# Patient Record
Sex: FEMALE | Employment: FULL TIME | URBAN - METROPOLITAN AREA
[De-identification: names, ages, dates, MRNs, and addresses within clinical notes are randomized per-mention and may not be internally consistent; named-entity substitution may affect disease eponyms.]

---

## 2019-07-23 ENCOUNTER — HOSPITAL ENCOUNTER (OUTPATIENT)
Dept: ULTRASOUND IMAGING | Age: 29
Discharge: HOME | End: 2019-07-23
Attending: PHYSICIAN ASSISTANT
Payer: COMMERCIAL

## 2019-07-23 DIAGNOSIS — R10.9 STOMACH ACHE: ICD-10-CM

## 2019-07-23 PROCEDURE — 76705 ECHO EXAM OF ABDOMEN: CPT

## 2021-10-05 LAB
ANTIBODY SCREEN, EXTERNAL: NEGATIVE
ANTIBODY SCREEN, EXTERNAL: NEGATIVE
CHLAMYDIA, EXTERNAL: NEGATIVE
HBSAG, EXTERNAL: NEGATIVE
HEPATITIS C AB,   EXT: NEGATIVE
HGB, EXTERNAL: 12.5
HIV, EXTERNAL: NEGATIVE
N. GONORRHEA, EXTERNAL: NEGATIVE
RPR, EXTERNAL: NEGATIVE
RUBELLA, EXTERNAL: NORMAL
TYPE, ABO & RH, EXTERNAL: NORMAL
URINALYSIS, EXTERNAL: NEGATIVE

## 2021-10-21 LAB
HGB EVAL, EXTERNAL: NORMAL
PLATELET CNT,   EXTERNAL: 263

## 2022-01-18 RX ORDER — ASPIRIN 81 MG/1
TABLET ORAL
COMMUNITY

## 2022-01-18 RX ORDER — DIPHENHYDRAMINE HCL 25 MG
CAPSULE ORAL
COMMUNITY

## 2022-01-18 RX ORDER — METOCLOPRAMIDE 10 MG/1
TABLET ORAL
COMMUNITY

## 2022-01-18 RX ORDER — LORATADINE 10 MG/1
TABLET ORAL
COMMUNITY
End: 2022-02-18

## 2022-01-18 NOTE — PATIENT INSTRUCTIONS
Weeks 18 to 22 of Your Pregnancy: Care Instructions  Overview     Your baby is continuing to develop quickly. Sometime between 18 and 22 weeks, you'll probably start to feel your baby move. At first, these small fetal movements feel like fluttering or \"butterflies. \" Or they may feel like gas bubbles. As your baby grows, these movements will become stronger. You may also notice that your baby hiccups. Babies at this stage can now suck their thumbs. You may find that your nausea and fatigue are gone. You may feel better overall and have more energy than you did in your first trimester. But you might now also have some new discomforts, like sleep problems or leg cramps. Talk to your doctor about things you can do at home to ease these problems. Follow-up care is a key part of your treatment and safety. Be sure to make and go to all appointments, and call your doctor if you are having problems. It's also a good idea to know your test results and keep a list of the medicines you take. How can you care for yourself at home? Ease sleep problems  · Avoid caffeine in drinks or chocolate late in the day. · Get some exercise every day. · Take a warm shower or bath before bed. · Have a light snack or glass of milk at bedtime. · Do relaxation exercises in bed to calm your mind and body. · Support your legs and back with extra pillows. Try a pillow between your legs if you sleep on your side. · Do not use sleeping pills or alcohol. They could harm your baby. Ease leg cramps  · Do not massage your calf during the cramp. · Sit on a firm bed or chair. Straighten your leg, and bend your foot (flex your ankle) slowly upward, toward your knee. Bend your toes up and down. · Stand on a cool, flat surface. Stretch your toes upward, and take small steps walking on your heels. · Use a heating pad or hot water bottle to help with muscle ache. Prevent leg cramps  · Be sure to get enough calcium.  If you are worried that you are not getting enough, talk to your doctor. · Exercise every day, and stretch your legs before bed. · Take a warm bath before bed, and try leg warmers at night. Where can you learn more? Go to http://www.gray.com/  Enter L819 in the search box to learn more about \"Weeks 18 to 22 of Your Pregnancy: Care Instructions. \"  Current as of: June 16, 2021               Content Version: 13.0  © 2006-2021 Healthwise, [facility]. Care instructions adapted under license by [employer] (which disclaims liability or warranty for this information). If you have questions about a medical condition or this instruction, always ask your healthcare professional. [address] any warranty or liability for your use of this information.

## 2022-01-19 ENCOUNTER — INITIAL PRENATAL (OUTPATIENT)
Dept: OBGYN CLINIC | Age: 32
End: 2022-01-19

## 2022-01-19 VITALS — WEIGHT: 199.4 LBS | HEART RATE: 101 BPM | SYSTOLIC BLOOD PRESSURE: 121 MMHG | DIASTOLIC BLOOD PRESSURE: 81 MMHG

## 2022-01-19 DIAGNOSIS — Z34.80 PRENATAL CARE OF MULTIGRAVIDA, ANTEPARTUM: Primary | ICD-10-CM

## 2022-01-19 PROCEDURE — 0502F SUBSEQUENT PRENATAL CARE: CPT | Performed by: OBSTETRICS & GYNECOLOGY

## 2022-01-19 RX ORDER — ASCORBIC ACID, CHOLECALCIFEROL, .ALPHA.-TOCOPHEROL ACETATE, DL-, PYRIDOXINE HYDROCHLORIDE, FOLIC ACID, CYANOCOBALAMIN, BIOTIN, CALCIUM CARBONATE, FERROUS ASPARTO GLYCINATE, IRON, POTASSIUM IODIDE, MAGNESIUM OXIDE, DOCONEXENT AND LOWBUSH BLUEBERRY 60; 1000; 10; 26; 400; 13; 280; 80; 9; 9; 150; 25; 350; 25; 600 MG/1; [IU]/1; [IU]/1; MG/1; UG/1; UG/1; UG/1; MG/1; MG/1; MG/1; UG/1; MG/1; MG/1; MG/1; UG/1
CAPSULE, GELATIN COATED ORAL
COMMUNITY
Start: 2021-11-18

## 2022-01-19 RX ORDER — FAMOTIDINE 20 MG/1
20 TABLET, FILM COATED ORAL 2 TIMES DAILY
COMMUNITY

## 2022-01-19 NOTE — PROGRESS NOTES
[facility]  [email or web address]  [phone]    Maude Velásquez MD, FACOG       [facility]   FOLLOW UP OB NOTE WITH ULTRASOUND    Chief Complaint   Patient presents with    Routine Prenatal Visit       The patient reports the following concerns: none    I discussed with the patient the limitations of ultrasound and that imaging can not rule out all birth defects, chromosomal problems, or other problems with the baby. Bleeding precautions. Rec MFM fu: has appt scheduled in [address] [place] [address] risk of pregnancy complications with previa and myoma  rec serial growth US w mfm  Bleeding precautions    Disc we need outside OB records and MFM records. Fu 2 wks review records,  MFM referral: pt has already        US findings: FETAL SURVEY  A SINGLE VIABLE IUP AT 23W4D GA BY LMP IS SEEN. FETAL CARDIAC MOTION OBSERVED. FETAL ANATOMY WELL VISUALIZED AND APPEARS WITHIN NORMAL LIMITS. NO ABNORMALITIES ARE SEEN ON TODAY'S EXAM.  FACE, NOSE/LIPS, PROFILE, CSP, LAT VENT, CER/CM, CP, SPINE, KIDNEYS, STOMACH/DIAPHRAGM, BLADDER, 3VC,  CORD INSERTION, 4CH, 3VV, ARMS, LEGS, HANDS, FEET. ANATOMY NOT SEEN: RVOT, LVOT, DA, AO.  APPROPRIATE FETAL GROWTH IS SEEN. SIZE=DATES. ASYA APPEAR WITHIN NORMAL LIMITS. ABNORMALITIES SEEN IN THE POSTERIOR HERIBERTO AT THE LEVEL OF THE CERVIX. POSSIBLE FIBROIDS VS PREVIA.  RECOMMENDED. GENDER: XX, PATIENT DOES NOT KNOW. Physician review of ultrasound performed by technician    Today's ultrasound report and images were reviewed and discussed with the patient.   Please see images and imaging report entered by technician in PACS for more detail and progress note and diagnosis entered by MD. Sharona Velasco MD      On this date, 2022,  I have spent 20  minutes reviewing previous notes, test results and face to face with the patient discussing the diagnosis and importance of compliance with the treatment plan as well as documenting on the day of the visit.

## 2022-01-20 LAB
ANTIBODY SCREEN, EXTERNAL: NEGATIVE
TYPE, ABO & RH, EXTERNAL: NEGATIVE

## 2022-01-21 LAB — TYPE, ABO & RH, EXTERNAL: NORMAL

## 2022-02-03 ENCOUNTER — ROUTINE PRENATAL (OUTPATIENT)
Dept: OBGYN CLINIC | Age: 32
End: 2022-02-03
Payer: COMMERCIAL

## 2022-02-03 VITALS
HEIGHT: 66 IN | WEIGHT: 202.6 LBS | SYSTOLIC BLOOD PRESSURE: 133 MMHG | BODY MASS INDEX: 32.56 KG/M2 | HEART RATE: 108 BPM | DIASTOLIC BLOOD PRESSURE: 80 MMHG

## 2022-02-03 DIAGNOSIS — Z34.80 PRENATAL CARE OF MULTIGRAVIDA, ANTEPARTUM: Primary | ICD-10-CM

## 2022-02-03 PROCEDURE — 0502F SUBSEQUENT PRENATAL CARE: CPT | Performed by: OBSTETRICS & GYNECOLOGY

## 2022-02-03 NOTE — PROGRESS NOTES
[facility]  [email or web address]  [phone]    Rob Solano MD, FACOG     Follow-up OB visit    Chief Complaint   Patient presents with    Routine Prenatal Visit       There are no problems to display for this patient. The patient reports the following concerns: c/o sinus pressure d/t severe allergies; before pregnancy she used to use more medications for it; she is usuing benadryl & claritin which is slightly helping. Pt has a nose bleed q morning when she wakes up for the last week. Glucola NV, instructions given. Has MFM fu in [place] 1/21    Vitals:    22 0923   BP: 133/80   Pulse: (!) 108   Weight: 202 lb 9.6 oz (91.9 kg)   Height: 5' 6\" (1.676 m)     See PN flowsheet for exam    33 y.o.  25w5d   Encounter Diagnosis   Name Primary?  Prenatal care of multigravida, antepartum Yes       glucola NV  Disc need for MFM records: business card/fax number given to pt so they can send report  Disc safer meds to use during pregnancy  Notify MD if NI     [] SAB/bleeding precautions reviewed   [x] PTL/PPROM precautions reviewed   [] Labor precautions reviewed   [] Fetal kick counts discussed   [] Labs reviewed with patient   [] Eulene Belling precautions reviewed   [] Consent reviewed   [] Handouts given to pt   [] Glucola handout    [] GBS/labor/Magic Hour handout   []    [] We reviewed CDC recommendations for Tdap for patient and close contacts and RBA of receiving in pregnancy, advised obtaining in third trimester   [] Reviewed healthy nutrition in pregnancy and good exercise practices   [] We disc safer medications in pregnancy and referred patient to [facility] resources   [] We reviewed CDC recommendations for flu vaccine and RBA of receiving in pregnancy   []    []    []       Follow-up and Dispositions    · Return in about 2 weeks (around 2022). No orders of the defined types were placed in this encounter.       Rob Solano MD

## 2022-02-03 NOTE — PATIENT INSTRUCTIONS
Learning About Screening for Gestational Diabetes  What is gestational diabetes screening? Screening for gestational diabetes is a way to look for high blood sugar during pregnancy. You drink some very sweet liquid. Then you have a blood test to see how your body uses sugar (glucose). How is gestational diabetes screening done? Screening for gestational diabetes may be done in a couple of ways. Two-part screening. · Part one (glucose challenge test): A blood sample is taken after you drink a liquid that contains sugar (glucose). You don't need to stop eating or drinking before this test. If the test shows that you don't have a lot of sugar in your blood, you don't have gestational diabetes. · Part two (oral glucose tolerance test, or OGTT): If the first test shows a lot of sugar in your blood, then you may have an OGTT. You can't eat or drink for at least 8 hours before this test. A blood sample is taken, then you drink a sweet liquid. You have more blood tests after 1 to 3 hours. If the OGTT shows that you have a lot of sugar in your blood, you may have gestational diabetes. One-part screening. Sometimes doctors use the OGTT on its own. If the test shows that you don't have a lot of sugar in your blood, you don't have gestational diabetes. If you do have a lot of sugar in your blood, you may have the condition. What are the risks of screening? Your blood glucose level may drop very low toward the end of the test. If this happens, you may feel weak, hungry, and restless. Tell your doctor if you have these symptoms. The test usually will be stopped. You may vomit after drinking the sweet liquid. If this happens, you may need to take the test at a later time. Your doctor may do more glucose tests at other times during your pregnancy. Follow-up care is a key part of your treatment and safety. Be sure to make and go to all appointments, and call your doctor if you are having problems.  It's also a good idea to know your test results and keep a list of the medicines you take. Where can you learn more? Go to http://www.gray.com/  Enter A472 in the search box to learn more about \"Learning About Screening for Gestational Diabetes. \"  Current as of: August 31, 2020               Content Version: 13.0  © 2006-2021 [employer]. Care instructions adapted under license by [employer] (which disclaims liability or warranty for this information). If you have questions about a medical condition or this instruction, always ask your healthcare professional. [place] [address] any warranty or liability for your use of this information. Weeks 22 to 26 of Your Pregnancy: Care Instructions  Overview     As you enter your 7th month of pregnancy at week 26, your baby's lungs are growing stronger and getting ready to breathe. You may notice that your baby responds to the sound of your voice. You may also notice that your baby does less turning and twisting and more squirming, kicking, or jerking. Jerking often means that your baby has hiccups. Hiccups are normal and are only temporary. You may want to think about attending a childbirth preparation class. This is also a good time to start thinking about whether you want to have pain medicine during labor. You may be tested for gestational diabetes between weeks 25 and 28. Gestational diabetes occurs when your blood sugar level gets too high when you're pregnant. The test is important, because you can have gestational diabetes and not know it. But the condition can cause problems for your baby. Follow-up care is a key part of your treatment and safety. Be sure to make and go to all appointments, and call your doctor if you are having problems. It's also a good idea to know your test results and keep a list of the medicines you take. How can you care for yourself at home?   Ease discomfort from your baby's kicking  · Change your position. Sometimes this will cause your baby to change position too. · Take a deep breath while you raise your arm over your head. Then breathe out while you drop your arm. Do Kegel exercises to prevent urine from leaking  · You can do Kegel exercises while you stand or sit. ? Squeeze the same muscles you would use to stop your urine. Your belly and thighs should not move. ? Hold the squeeze for 3 seconds, and then relax for 3 seconds. ? Start with 3 seconds. Then add 1 second each week until you are able to squeeze for 10 seconds. ? Repeat the exercise 10 to 15 times for each session. Do three or more sessions each day. Ease or reduce swelling in your feet, ankles, hands, and fingers  · If your fingers are puffy, take off your rings. · Do not eat high-salt foods, such as potato chips. · Prop up your feet on a stool or couch as much as possible. Sleep with pillows under your feet. · Do not stand for long periods of time or wear tight shoes. · Wear support stockings. Where can you learn more? Go to http://www.Taecanet.com/  Enter G264 in the search box to learn more about \"Weeks 22 to 26 of Your Pregnancy: Care Instructions. \"  Current as of: June 16, 2021               Content Version: 13.0  © 6223-9099 Healthwise, [employer]. Care instructions adapted under license by [employer] (which disclaims liability or warranty for this information). If you have questions about a medical condition or this instruction, always ask your healthcare professional. [place] [address] any warranty or liability for your use of this information.

## 2022-02-18 ENCOUNTER — ROUTINE PRENATAL (OUTPATIENT)
Dept: OBGYN CLINIC | Age: 32
End: 2022-02-18
Payer: COMMERCIAL

## 2022-02-18 VITALS
WEIGHT: 203.4 LBS | HEART RATE: 108 BPM | SYSTOLIC BLOOD PRESSURE: 114 MMHG | HEIGHT: 66 IN | BODY MASS INDEX: 32.69 KG/M2 | DIASTOLIC BLOOD PRESSURE: 75 MMHG

## 2022-02-18 DIAGNOSIS — Z23 ENCOUNTER FOR IMMUNIZATION: ICD-10-CM

## 2022-02-18 DIAGNOSIS — Z34.80 PRENATAL CARE OF MULTIGRAVIDA, ANTEPARTUM: Primary | ICD-10-CM

## 2022-02-18 DIAGNOSIS — Z67.91 BLOOD TYPE, RH NEGATIVE: ICD-10-CM

## 2022-02-18 LAB — GTT 60 MIN, EXTERNAL: 163

## 2022-02-18 PROCEDURE — 96372 THER/PROPH/DIAG INJ SC/IM: CPT | Performed by: OBSTETRICS & GYNECOLOGY

## 2022-02-18 PROCEDURE — 90715 TDAP VACCINE 7 YRS/> IM: CPT | Performed by: OBSTETRICS & GYNECOLOGY

## 2022-02-18 PROCEDURE — 90384 RH IG FULL-DOSE IM: CPT | Performed by: OBSTETRICS & GYNECOLOGY

## 2022-02-18 PROCEDURE — 90471 IMMUNIZATION ADMIN: CPT | Performed by: OBSTETRICS & GYNECOLOGY

## 2022-02-18 PROCEDURE — 0502F SUBSEQUENT PRENATAL CARE: CPT | Performed by: OBSTETRICS & GYNECOLOGY

## 2022-02-18 NOTE — PROGRESS NOTES
_ [facility]  [email or web address]  [phone]    Karolyn Murphy MD, FACOG     Follow-up OB visit    Chief Complaint   Patient presents with    Routine Prenatal Visit       There are no problems to display for this patient. The patient reports the following concerns: doing well, c/o fatigue. Gtt, cbc, tdap, 28wk handout, antibody screen, rhogam today. MFM on 22. Vitals:    22 0931   BP: 114/75   Pulse: (!) 108   Weight: 203 lb 6.4 oz (92.3 kg)   Height: 5' 6\" (1.676 m)     See PN flowsheet for exam    33 y.o.  27w6d   Encounter Diagnoses   Name Primary?  Encounter for immunization     Blood type, Rh negative     Prenatal care of multigravida, antepartum Yes     Awaiting MFM fu re ?  Previa and plan for delivery  Bleeding precautions  Pt will have C/ Jesus Cho [address] sent to me (has my card)  Disc inc risks for bleeding/CS depending on placenta   [] SAB/bleeding precautions reviewed   [] PTL/PPROM precautions reviewed   [] Labor precautions reviewed   [] Fetal kick counts discussed   [] Labs reviewed with patient   [] Karen Hassell precautions reviewed   [x] Consent reviewed   [] Handouts given to pt   [] Glucola handout    [] GBS/labor/Magic Hour handout   []    [] We reviewed CDC recommendations for Tdap for patient and close contacts and RBA of receiving in pregnancy, advised obtaining in third trimester   [] Reviewed healthy nutrition in pregnancy and good exercise practices   [] We disc safer medications in pregnancy and referred patient to [facility] resources   [] We reviewed CDC recommendations for flu vaccine and RBA of receiving in pregnancy   []    []    []           Orders Placed This Encounter    [place] [address] [place]    Tetanus, diphtheria toxoids and acellular pertussis (TDAP) vaccine, in individuals >=7 years, IM    GLUCOSE, GESTATIONAL 1 HR TOLERANCE    CBC W/O DIFF    ANTIBODY SCREEN    FL IMMUNIZ ADMIN,1 SINGLE/COMB VAC/TOXOID    rho D immune globulin (RHOGAM) 1,500 unit (300 mcg) injection 0.3 mg       Asael Stephens MD

## 2022-02-18 NOTE — PROGRESS NOTES
Myra Pace is a 33 y.o. female who presents for tdap immunization per verbal order from Nilton Olmos MD.  She denies any symptoms , reactions or allergies that would exclude them from being immunized today. Risks and adverse reactions were discussed and the VIS was given to her. All questions were addressed. She was observed for 10 min post injection. There were no reactions observed. Myra Pace is a 33 y.o. female who presents for rhogam immunization per verbal order from Nilton Olmos MD.  She denies any symptoms , reactions or allergies that would exclude them from being immunized today. Risks and adverse reactions were discussed and the VIS was given to her. All questions were addressed. She was observed for 10 min post injection. There were no reactions observed.

## 2022-02-18 NOTE — PATIENT INSTRUCTIONS
Weeks 26 to 30 of Your Pregnancy: Care Instructions  Overview     You are now entering your last trimester of pregnancy. Your baby is growing quickly. Ulises Huynh probably feel your baby moving around more often. Your doctor may ask you to count your baby's kicks. Your back may ache as your body gets used to your baby's size and length. If you haven't already had the Tdap shot during this pregnancy, talk to your doctor about getting it. It will help protect your  against pertussis infection. During this time, it's important to take care of yourself and pay attention to what your body needs. If you feel sexual, you can explore ways to be close with your partner that match your comfort and desire. Follow-up care is a key part of your treatment and safety. Be sure to make and go to all appointments, and call your doctor if you are having problems. It's also a good idea to know your test results and keep a list of the medicines you take. How can you care for yourself at home? Take it easy at work  · Take frequent breaks. If possible, stop working when you are tired, and rest during your lunch hour. · Take bathroom breaks every 2 hours. · Change positions often. If you sit for long periods, stand up and walk around. · When you stand for a long time, keep one foot on a low stool with your knee bent. After standing a lot, sit with your feet up. · Avoid fumes, chemicals, and tobacco smoke. Be sexual in your own way  · Having sex during pregnancy is okay, unless your doctor tells you not to. · You may be very interested in sex, or you may have no interest at all. · Your growing belly can make it hard to find a good position during intercourse. [place] and explore. · You may get cramps in your uterus when your partner touches your breasts. · A back rub may relieve the backache or cramps that sometimes follow orgasm. Learn about  labor  · Watch for signs of  labor.  You may be going into labor if:  ? You have menstrual-like cramps, with or without nausea. ? You have about 6 or more contractions in 1 hour, even after you have had a glass of water and are resting. ? You have a low, dull backache that does not go away when you change your position. ? You have pain or pressure in your pelvis that comes and goes in a pattern. ? You have intestinal cramping or flu-like symptoms, with or without diarrhea.  ? You notice an increase or change in your vaginal discharge. Discharge may be heavy, mucus-like, watery, or streaked with blood. ? Your water breaks. · If you think you have  labor:  ? Drink 2 or 3 glasses of water or juice. Not drinking enough fluids can cause contractions. ? Stop what you are doing, and empty your bladder. Then lie down on your left side for at least 1 hour. ? While lying on your side, find your breast bone. Put your fingers in the soft spot just below it. Move your fingers down toward your belly button to find the top of your uterus. Check to see if it is tight. ? Contractions can be weak or strong. Record your contractions for an hour. Time a contraction from the start of one contraction to the start of the next one.  ? Single or several strong contractions without a pattern are called [place] contractions. They are practice contractions but not the start of labor. They often stop if you change what you are doing. ? Call your doctor if you have regular contractions. Where can you learn more? Go to [email or web address]  Enter E130 in the search box to learn more about \"Weeks 26 to 30 of Your Pregnancy: Care Instructions. \"  Current as of: 2021               Content Version: 13.0  © 9903-4958 [employer]. Care instructions adapted under license by [employer] (which disclaims liability or warranty for this information).  If you have questions about a medical condition or this instruction, always ask your healthcare professional. [address] any warranty or liability for your use of this information. Vaccine Information Statement    Tdap (Tetanus, Diphtheria, Pertussis) Vaccine: What You Need to Know     Many vaccine information statements are available in [place] and other languages. See www.immunize.org/vis. Hojas de información sobre vacunas están disponibles en español y en muchos otros idiomas. Visite www.immunize.org/vis. 1. Why get vaccinated? Tdap vaccine can prevent tetanus, diphtheria, and pertussis. Diphtheria and pertussis spread from person to person. Tetanus enters the body through cuts or wounds.  TETANUS (T) causes painful stiffening of the muscles. Tetanus can lead to serious health problems, including being unable to open the mouth, having trouble swallowing and breathing, or death.  DIPHTHERIA (D) can lead to difficulty breathing, heart failure, paralysis, or death.  PERTUSSIS (aP), also known as whooping cough, can cause uncontrollable, violent coughing that makes it hard to breathe, eat, or drink. Pertussis can be extremely serious especially in babies and young children, causing pneumonia, convulsions, brain damage, or death. In teens and adults, it can cause weight loss, loss of bladder control, passing out, and rib fractures from severe coughing. 2. Tdap vaccine     Tdap is only for children 7 years and older, adolescents, and adults. Adolescents should receive a single dose of Tdap, preferably at age 6 or 15 years. Pregnant people should get a dose of Tdap during every pregnancy, preferably during the early part of the third trimester, to help protect the  from pertussis. Infants are most at risk for severe, life-threatening complications from pertussis. Adults who have never received Tdap should get a dose of Tdap.       Also, adults should receive a booster dose of either Tdap or Td (a different vaccine that protects against tetanus and diphtheria but not pertussis) every 10 years, or after 5 years in the case of a severe or dirty wound or burn. Tdap may be given at the same time as other vaccines. 3. Talk with your health care provider    Tell your vaccination provider if the person getting the vaccine:   Has had an allergic reaction after a previous dose of any vaccine that protects against tetanus, diphtheria, or pertussis, or has any severe, life-threatening allergies    Has had a coma, decreased level of consciousness, or prolonged seizures within 7 days after a previous dose of any pertussis vaccine (DTP, DTaP, or Tdap)   Has seizures or another nervous system problem   Has ever had Guillain-Barré Syndrome (also called GBS)   Has had severe pain or swelling after a previous dose of any vaccine that protects against tetanus or diphtheria    In some cases, your health care provider may decide to postpone Tdap vaccination until a future visit. People with minor illnesses, such as a cold, may be vaccinated. People who are moderately or severely ill should usually wait until they recover before getting Tdap vaccine. Your health care provider can give you more information. 4. Risks of a vaccine reaction     Pain, redness, or swelling where the shot was given, mild fever, headache, feeling tired, and nausea, vomiting, diarrhea, or stomachache sometimes happen after Tdap vaccination. People sometimes faint after medical procedures, including vaccination. Tell your provider if you feel dizzy or have vision changes or ringing in the ears. As with any medicine, there is a very remote chance of a vaccine causing a severe allergic reaction, other serious injury, or death. 5. What if there is a serious problem? An allergic reaction could occur after the vaccinated person leaves the clinic.  If you see signs of a severe allergic reaction (hives, swelling of the face and throat, difficulty breathing, a fast heartbeat, dizziness, or weakness), call 9-1-1 and get the person to the nearest hospital.    For other signs that concern you, call your health care provider. Adverse reactions should be reported to the Vaccine Adverse Event Reporting System (VAERS). Your health care provider will usually file this report, or you can do it yourself. Visit the VAERS website at www.vaers. [facility].gov or call 7-917.134.8069. VAERS is only for reporting reactions, and VAERS staff members do not give medical advice. 6. The National Vaccine Injury Compensation Program    The [facility] Vaccine Injury Compensation Program (VICP) is a federal program that was created to compensate people who may have been injured by certain vaccines. Claims regarding alleged injury or death due to vaccination have a time limit for filing, which may be as short as two years. Visit the VICP website at www.[facility].gov/vaccinecompensation or call 9-387.120.8744 to learn about the program and about filing a claim. 7. How can I learn more?  Ask your health care provider.  Call your local or state health department.  Visit the website of the Food and Drug Administration (FDA) for vaccine package inserts and additional information at www.fda.gov/vaccines-blood-biologics/vaccines.  Contact the Centers for Disease Control and Prevention (CDC):  - Call 7-335.733.2190 (1-800-CDC-INFO) or  - Visit CDCs website at www.cdc.gov/vaccines. Vaccine Information Statement   Tdap (Tetanus, Diphtheria, Pertussis) Vaccine  8/6/2021  42 U. Ivet Given [id number]   Department of Health and Human Services  Centers for Disease Control and Prevention    Office Use Only

## 2022-02-19 LAB
BLOOD BANK CMNT PATIENT-IMP: NORMAL
BLOOD GROUP ANTIBODIES SERPL: NORMAL
ERYTHROCYTE [DISTWIDTH] IN BLOOD BY AUTOMATED COUNT: 14.9 % (ref 11.5–14.5)
GLUCOSE 1H P 100 G GLC PO SERPL-MCNC: 163 MG/DL (ref 65–140)
HCT VFR BLD AUTO: 34.5 % (ref 35–47)
HGB BLD-MCNC: 10.7 G/DL (ref 11.5–16)
MCH RBC QN AUTO: 27.9 PG (ref 26–34)
MCHC RBC AUTO-ENTMCNC: 31 G/DL (ref 30–36.5)
MCV RBC AUTO: 89.8 FL (ref 80–99)
NRBC # BLD: 0 K/UL (ref 0–0.01)
NRBC BLD-RTO: 0 PER 100 WBC
PLATELET # BLD AUTO: 252 K/UL (ref 150–400)
PMV BLD AUTO: 11.5 FL (ref 8.9–12.9)
RBC # BLD AUTO: 3.84 M/UL (ref 3.8–5.2)
WBC # BLD AUTO: 10.6 K/UL (ref 3.6–11)

## 2022-02-22 ENCOUNTER — LAB ONLY (OUTPATIENT)
Dept: OBGYN CLINIC | Age: 32
End: 2022-02-22

## 2022-02-22 DIAGNOSIS — R73.09 ELEVATED GLUCOSE TOLERANCE TEST: ICD-10-CM

## 2022-02-22 DIAGNOSIS — Z34.80 PRENATAL CARE OF MULTIGRAVIDA, ANTEPARTUM: Primary | ICD-10-CM

## 2022-02-22 LAB
GTT 120 MIN, EXTERNAL: 142
GTT 180 MIN, EXTERNAL: 108
GTT 60 MIN, EXTERNAL: 186
GTT, FASTING, EXTERNAL: 95

## 2022-02-23 LAB
GLUCOSE 1H P 100 G GLC PO SERPL-MCNC: 186 MG/DL (ref 65–179)
GLUCOSE 2H P 100 G GLC PO SERPL-MCNC: 142 MG/DL (ref 65–154)
GLUCOSE 3H P 100 G GLC PO SERPL-MCNC: 108 MG/DL (ref 65–139)
GLUCOSE P FAST SERPL-MCNC: 95 MG/DL (ref 65–94)
NOTE:, 102047: ABNORMAL

## 2022-02-24 NOTE — PROGRESS NOTES
Abnormal results. Update PNL/PL  Notify pt about follow up and about results if [employer] message not read or not active.   C/W gestational diabetes, add to PNL and PL   Refer to endocrine to manage GDM asap (< 2 weeks)  Refer to MFM: set up q 4 wk US for GDM (start at~ 32 wks)  Rec healthy balanced diet, and daily exercise 30min/day  Refer to Diabetes Treatment Team if Endo visit not available in <2 weeks, or if pt needs to see MFM to manage GDM

## 2022-02-26 ENCOUNTER — TELEPHONE (OUTPATIENT)
Dept: OBGYN CLINIC | Age: 32
End: 2022-02-26

## 2022-02-26 DIAGNOSIS — R73.09 ELEVATED GLUCOSE TOLERANCE TEST: Primary | ICD-10-CM

## 2022-02-26 DIAGNOSIS — O24.419 GESTATIONAL DIABETES MELLITUS (GDM), ANTEPARTUM, GESTATIONAL DIABETES METHOD OF CONTROL UNSPECIFIED: ICD-10-CM

## 2022-02-26 DIAGNOSIS — Z34.80 PRENATAL CARE OF MULTIGRAVIDA, ANTEPARTUM: ICD-10-CM

## 2022-02-26 NOTE — TELEPHONE ENCOUNTER
Follow up call to Ms. Cynthia Gilmore - Pt verified self and birth date for privacy precautions. Patient was advised of results & options. Pt will see MFM to manage GDM asap & ultrasounds q 4 weeks starting at ~32 weeks. Pt will also see Carole Casey with diabetic education, her first visit with her is on 3/8/22 at 1:00pm.        Ms. Cynthia Gilmore acknowledged understanding and all questions were answered to patients satisfaction. No further questions or concerns at this time.

## 2022-02-26 NOTE — PROGRESS NOTES
Updated PL & PNL    Pt will need to get iron/ferritin labs on 3/4/22, she did not have them drawn with 3hr gtt on 2/22/22. Pt will see JOHN & Javon Mandujano to manage GDM.

## 2022-02-26 NOTE — PROGRESS NOTES
Updated PL & PNL    Pt will need to get iron/ferritin labs on 3/4/22, she did not have them drawn with 3hr gtt on 2/22/22.

## 2022-03-01 ENCOUNTER — TELEPHONE (OUTPATIENT)
Dept: OBGYN CLINIC | Age: 32
End: 2022-03-01

## 2022-03-01 RX ORDER — COVID-19 MOLECULAR TEST ASSAY
KIT MISCELLANEOUS SEE ADMIN INSTRUCTIONS
COMMUNITY
Start: 2022-02-19

## 2022-03-01 NOTE — PROGRESS NOTES
_ [facility]  [email or web address]  [phone]    Xin Matos MD, FACOG     Follow-up OB visit    Chief Complaint   Patient presents with    Routine Prenatal Visit       Patient Active Problem List    Diagnosis Date Noted    Prenatal care of multigravida, antepartum 2022          The patient reports the following concerns: c/o fatigue, doing well otherwise. failed 3 hour GTT on 22--> c/w GDM. Pt will see MFM & Diabetic Education to manage gestational diabetes, pt will also see MFM for ultrasounds starting at ~32 weeks, followed by ultrasounds q4 weeks. Pt see's an MFM in [place] [address] [address] [facility] Next visit with Moy/Diabetic Education on 3/8/22. Pt see's MFM on 3/7/22 &  3/31/22 (31w6d). No VB    Vitals:    22 0914   BP: 108/72   Weight: 206 lb 3.2 oz (93.5 kg)   Height: 5' 6\" (1.676 m)      See PN flowsheet for exam    33 y.o.  29w3d   Encounter Diagnoses   Name Primary?     Prenatal care of multigravida, antepartum     Placenta previa antepartum Yes    Anemia during pregnancy     Gestational diabetes mellitus (GDM), antepartum, gestational diabetes method of control unspecified     Fibroids          Reviewed MFM fu: +previa post to ant and post myomas  +GDM, has not been checking BS: rec glucometer and fasting and 1 hr pp, bring log to MFM  Disc options for iron and anemia: consider slow fe, pt defers rx due to cost  Disc high risk of PTD, bleeding, hysterectomy, delivery complications due to  Anemia  Gdm, disc importance of BS monitoring   Myomas  And placenta previa    Rec referral at higher level of care facility with adequate blood product availability and surgery specialities if needed  MFM referral to [facility] 3/4/2022 9:51 AM  D/w Kirstin per Dr. Yadi Herrera  Will try to get in next week  Needs: demographic, records, insurance info faxed  [phone]  She will contact pt    Strict bleeding and labor precautions  [facility] BID       [] SAB/bleeding precautions reviewed   [] PTL/PPROM precautions reviewed   [] Labor precautions reviewed   [] Fetal kick counts discussed   [] Labs reviewed with patient   [] Selwynmarcello Dennis precautions reviewed   [] Consent reviewed   [] Handouts given to pt   [] Glucola handout    [] GBS/labor/Magic Hour handout   []    [] We reviewed CDC recommendations for Tdap for patient and close contacts and RBA of receiving in pregnancy, advised obtaining in third trimester   [] Reviewed healthy nutrition in pregnancy and good exercise practices   [] We disc safer medications in pregnancy and referred patient to [facility] resources   [] We reviewed CDC recommendations for flu vaccine and RBA of receiving in pregnancy   []    []    []           Orders Placed This Encounter   Garfield Martinez MD

## 2022-03-01 NOTE — TELEPHONE ENCOUNTER
Pt will see MFM to manage GDM asap & ultrasounds q 4 weeks starting at ~32 weeks. Notified the patient of this. She sees MFM in [facility].

## 2022-03-01 NOTE — PATIENT INSTRUCTIONS
Weeks 26 to 30 of Your Pregnancy: Care Instructions  Overview     You are now entering your last trimester of pregnancy. Your baby is growing quickly. Neema Sanedrs probably feel your baby moving around more often. Your doctor may ask you to count your baby's kicks. Your back may ache as your body gets used to your baby's size and length. If you haven't already had the Tdap shot during this pregnancy, talk to your doctor about getting it. It will help protect your  against pertussis infection. During this time, it's important to take care of yourself and pay attention to what your body needs. If you feel sexual, you can explore ways to be close with your partner that match your comfort and desire. Follow-up care is a key part of your treatment and safety. Be sure to make and go to all appointments, and call your doctor if you are having problems. It's also a good idea to know your test results and keep a list of the medicines you take. How can you care for yourself at home? Take it easy at work  · Take frequent breaks. If possible, stop working when you are tired, and rest during your lunch hour. · Take bathroom breaks every 2 hours. · Change positions often. If you sit for long periods, stand up and walk around. · When you stand for a long time, keep one foot on a low stool with your knee bent. After standing a lot, sit with your feet up. · Avoid fumes, chemicals, and tobacco smoke. Be sexual in your own way  · Having sex during pregnancy is okay, unless your doctor tells you not to. · You may be very interested in sex, or you may have no interest at all. · Your growing belly can make it hard to find a good position during intercourse. Duck and explore. · You may get cramps in your uterus when your partner touches your breasts. · A back rub may relieve the backache or cramps that sometimes follow orgasm. Learn about  labor  · Watch for signs of  labor.  You may be going into labor if:  ? You have menstrual-like cramps, with or without nausea. ? You have about 6 or more contractions in 1 hour, even after you have had a glass of water and are resting. ? You have a low, dull backache that does not go away when you change your position. ? You have pain or pressure in your pelvis that comes and goes in a pattern. ? You have intestinal cramping or flu-like symptoms, with or without diarrhea.  ? You notice an increase or change in your vaginal discharge. Discharge may be heavy, mucus-like, watery, or streaked with blood. ? Your water breaks. · If you think you have  labor:  ? Drink 2 or 3 glasses of water or juice. Not drinking enough fluids can cause contractions. ? Stop what you are doing, and empty your bladder. Then lie down on your left side for at least 1 hour. ? While lying on your side, find your breast bone. Put your fingers in the soft spot just below it. Move your fingers down toward your belly button to find the top of your uterus. Check to see if it is tight. ? Contractions can be weak or strong. Record your contractions for an hour. Time a contraction from the start of one contraction to the start of the next one.  ? Single or several strong contractions without a pattern are called [place] contractions. They are practice contractions but not the start of labor. They often stop if you change what you are doing. ? Call your doctor if you have regular contractions. Where can you learn more? Go to http://www.gray.com/  Enter Q781 in the search box to learn more about \"Weeks 26 to 30 of Your Pregnancy: Care Instructions. \"  Current as of: 2021               Content Version: 13.0  © 5631-8764 [employer]. Care instructions adapted under license by [employer] (which disclaims liability or warranty for this information).  If you have questions about a medical condition or this instruction, always ask your healthcare professional. [place] [address] any warranty or liability for your use of this information.

## 2022-03-04 ENCOUNTER — PATIENT MESSAGE (OUTPATIENT)
Dept: OBGYN CLINIC | Age: 32
End: 2022-03-04

## 2022-03-04 ENCOUNTER — ROUTINE PRENATAL (OUTPATIENT)
Dept: OBGYN CLINIC | Age: 32
End: 2022-03-04
Payer: COMMERCIAL

## 2022-03-04 VITALS
DIASTOLIC BLOOD PRESSURE: 72 MMHG | BODY MASS INDEX: 33.14 KG/M2 | WEIGHT: 206.2 LBS | SYSTOLIC BLOOD PRESSURE: 108 MMHG | HEIGHT: 66 IN

## 2022-03-04 DIAGNOSIS — Z34.80 PRENATAL CARE OF MULTIGRAVIDA, ANTEPARTUM: ICD-10-CM

## 2022-03-04 DIAGNOSIS — O99.019 ANEMIA DURING PREGNANCY: ICD-10-CM

## 2022-03-04 DIAGNOSIS — O24.419 GESTATIONAL DIABETES MELLITUS (GDM), ANTEPARTUM, GESTATIONAL DIABETES METHOD OF CONTROL UNSPECIFIED: ICD-10-CM

## 2022-03-04 DIAGNOSIS — D21.9 FIBROIDS: ICD-10-CM

## 2022-03-04 DIAGNOSIS — O44.00 PLACENTA PREVIA ANTEPARTUM: Primary | ICD-10-CM

## 2022-03-04 PROCEDURE — 0502F SUBSEQUENT PRENATAL CARE: CPT | Performed by: OBSTETRICS & GYNECOLOGY

## 2022-03-04 RX ORDER — ASCORBIC ACID 250 MG
TABLET ORAL
COMMUNITY

## 2022-03-04 RX ORDER — LANOLIN ALCOHOL/MO/W.PET/CERES
CREAM (GRAM) TOPICAL
COMMUNITY

## 2022-03-05 LAB
FERRITIN SERPL-MCNC: 15 NG/ML (ref 8–252)
IRON SATN MFR SERPL: 9 % (ref 20–50)
IRON SERPL-MCNC: 41 UG/DL (ref 35–150)
TIBC SERPL-MCNC: 448 UG/DL (ref 250–450)

## 2022-03-09 ENCOUNTER — TELEPHONE (OUTPATIENT)
Dept: OBGYN CLINIC | Age: 32
End: 2022-03-09

## 2022-03-09 NOTE — TELEPHONE ENCOUNTER
Radha from [facility] calling to let Dr. Shilpa Ribeiro know that the pregnant patient 30w4d did follow up with them and she has a nutritional visit today and she sees MFM w/ US tomorrow.      LINDSEY

## 2022-03-17 NOTE — PROGRESS NOTES
_ [address] [facility]  [email or web address]  [phone]    Rob Solnao MD, FACOG     Follow-up OB visit    Chief Complaint   Patient presents with    Routine Prenatal Visit       Patient Active Problem List    Diagnosis Date Noted    Prenatal care of multigravida, antepartum 2022          The patient reports the following concerns: seeing [facility] MFM on 3/21/22 & 3/25/22, they are managing GDM. Pt c/o low back pain, using belt, doesn't help much. Iron causing her nausea, taking b6, she has tried rotating times to take iron, nothing is helping. Vitals:    22 0953   BP: 110/72   Pulse: 91   Weight: 206 lb 6.4 oz (93.6 kg)   Height: 5' 6\" (1.676 m)     See PN flowsheet for exam    33 y.o.  31w5d   Encounter Diagnoses   Name Primary?     Prenatal care of multigravida, antepartum Yes    Iron deficiency anemia, unspecified iron deficiency anemia type     Fibroids     Gestational diabetes mellitus (GDM), antepartum, gestational diabetes method of control unspecified     Placenta previa antepartum      [facility] fu for delivery and management of GDM  [facility] bid  Bleeding precautions  Can do [facility] for FOB   [] SAB/bleeding precautions reviewed   [x] PTL/PPROM precautions reviewed   [] Labor precautions reviewed   [] Fetal kick counts discussed   [] Labs reviewed with patient   [] Eulene Belling precautions reviewed   [] Consent reviewed   [] Handouts given to pt   [] Glucola handout    [] GBS/labor/Magic Hour handout   []    [] We reviewed CDC recommendations for Tdap for patient and close contacts and RBA of receiving in pregnancy, advised obtaining in third trimester   [] Reviewed healthy nutrition in pregnancy and good exercise practices   [] We disc safer medications in pregnancy and referred patient to [facility] resources   [] We reviewed CDC recommendations for flu vaccine and RBA of receiving in pregnancy   []    []    []           Orders Placed This Encounter    CBC W/O DIFF       Cheyanne CEDEÑO Shabbir Bergeron MD

## 2022-03-17 NOTE — PATIENT INSTRUCTIONS
Weeks 30 to 32 of Your Pregnancy: Care Instructions  Overview     You've made it to the final months of your pregnancy! By now your baby is really starting to look like a baby, with hair and plump skin. As you enter the final weeks of pregnancy, the reality of having a baby may start to set in. This is a good time to set up a safe nursery and find quality  if needed. Doing this stuff ahead of time will allow you to focus on caring for and enjoying your new baby. You may also want to take a tour of your hospital's labor and delivery unit. This will help you get a better idea of what to expect while you're in the hospital.  During these last months, be sure to take good care of yourself. Pay attention to what your body needs. If your doctor says it's okay for you to work, don't push yourself too hard. If you haven't already had the Tdap shot during this pregnancy, talk to your doctor about getting it. It will help protect your  against pertussis infection. Follow-up care is a key part of your treatment and safety. Be sure to make and go to all appointments, and call your doctor if you are having problems. It's also a good idea to know your test results and keep a list of the medicines you take. How can you care for yourself at home? Pay attention to your baby's movements  · You should feel your baby move several times every day. · Your baby now turns less, and kicks and jabs more. · Your baby sleeps 20 to 45 minutes at a time and is more active at certain times of day. · If your doctor wants you to count your baby's kicks:  ? Empty your bladder, and lie on your side or relax in a comfortable chair. ? Write down your start time. ? Pay attention only to your baby's movements. Count any movement except hiccups. ? After you have counted 10 movements, write down your stop time. ? Write down how many minutes it took for your baby to move 10 times.   ? If an hour goes by and you have not recorded 10 movements, have something to eat or drink and then count for another hour. If you don't record at least 10 movements in the 2-hour period, call your doctor. Ease heartburn  · Eat small, frequent meals. · Do not eat chocolate, peppermint, or very spicy foods. Avoid drinks with caffeine, such as coffee, tea, and sodas. · Avoid bending over or lying down after meals. · Take a short walk after you eat. · If heartburn is a problem at night, do not eat for 2 hours before bedtime. · Take antacids like Mylanta, Maalox, Rolaids, or Tums. Do not take antacids that have sodium bicarbonate. Care for varicose veins  · Varicose veins are blood vessels that stretch out with the extra blood during pregnancy. Your legs may ache or throb. Most varicose veins will go away after the birth. · Avoid standing for long periods of time. Sit with your legs crossed at the ankles, not the knees. · Sit with your feet propped up. · Avoid tight clothing or stockings. Wear support hose. · Exercise regularly. Try walking for at least 30 minutes a day. Where can you learn more? Go to http://www.gray.com/  Enter X471 in the search box to learn more about \"Weeks 30 to 32 of Your Pregnancy: Care Instructions. \"  Current as of: June 16, 2021               Content Version: 13.2  © 9840-9166 [employer]. Care instructions adapted under license by [employer] (which disclaims liability or warranty for this information). If you have questions about a medical condition or this instruction, always ask your healthcare professional. [place] [address] any warranty or liability for your use of this information.

## 2022-03-18 ENCOUNTER — ROUTINE PRENATAL (OUTPATIENT)
Dept: OBGYN CLINIC | Age: 32
End: 2022-03-18
Payer: COMMERCIAL

## 2022-03-18 VITALS
BODY MASS INDEX: 33.17 KG/M2 | DIASTOLIC BLOOD PRESSURE: 72 MMHG | SYSTOLIC BLOOD PRESSURE: 110 MMHG | WEIGHT: 206.4 LBS | HEIGHT: 66 IN | HEART RATE: 91 BPM

## 2022-03-18 DIAGNOSIS — D21.9 FIBROIDS: ICD-10-CM

## 2022-03-18 DIAGNOSIS — O24.419 GESTATIONAL DIABETES MELLITUS (GDM), ANTEPARTUM, GESTATIONAL DIABETES METHOD OF CONTROL UNSPECIFIED: ICD-10-CM

## 2022-03-18 DIAGNOSIS — O44.00 PLACENTA PREVIA ANTEPARTUM: ICD-10-CM

## 2022-03-18 DIAGNOSIS — Z34.80 PRENATAL CARE OF MULTIGRAVIDA, ANTEPARTUM: Primary | ICD-10-CM

## 2022-03-18 DIAGNOSIS — D50.9 IRON DEFICIENCY ANEMIA, UNSPECIFIED IRON DEFICIENCY ANEMIA TYPE: ICD-10-CM

## 2022-03-18 LAB
ERYTHROCYTE [DISTWIDTH] IN BLOOD BY AUTOMATED COUNT: 15.6 % (ref 11.5–14.5)
HCT VFR BLD AUTO: 36.2 % (ref 35–47)
HGB BLD-MCNC: 11.3 G/DL (ref 11.5–16)
MCH RBC QN AUTO: 27.5 PG (ref 26–34)
MCHC RBC AUTO-ENTMCNC: 31.2 G/DL (ref 30–36.5)
MCV RBC AUTO: 88.1 FL (ref 80–99)
NRBC # BLD: 0 K/UL (ref 0–0.01)
NRBC BLD-RTO: 0 PER 100 WBC
PLATELET # BLD AUTO: 253 K/UL (ref 150–400)
PMV BLD AUTO: 11.4 FL (ref 8.9–12.9)
RBC # BLD AUTO: 4.11 M/UL (ref 3.8–5.2)
WBC # BLD AUTO: 8.7 K/UL (ref 3.6–11)

## 2022-03-18 PROCEDURE — 0502F SUBSEQUENT PRENATAL CARE: CPT | Performed by: OBSTETRICS & GYNECOLOGY

## 2022-03-19 PROBLEM — Z34.80 PRENATAL CARE OF MULTIGRAVIDA, ANTEPARTUM: Status: ACTIVE | Noted: 2022-02-18

## 2022-03-19 NOTE — PROGRESS NOTES
Labs show anemia   Recommend iron per protocol below. Notify patient if not on [address] and add anemia to PMH. Patient should start, or add, an over the counter elemental iron supplement of 45-65 mg. Consider 'Slow FE' or ferrous sulfate 325 mg once a day. If HGB <10, then recommend iron supplement twice a day. Take vitamin C 250 mg and a daily prenatal vitamin to help anemia. Add a stool softener, like docusate or colace 100 mg (2x/day) to avoid constipation. If you do not tolerate supplements you can try blackstrap molasses (1 tbsp=27mg elemental iron).

## 2022-03-29 NOTE — PROGRESS NOTES
Written by Lela Wilkins MD on 3/18/2022  9:16 PM EDT View Full Comments  Seen by patient Naheed Tinoco on 3/20/2022  7:46 AM

## 2023-08-21 ENCOUNTER — OFFICE VISIT (OUTPATIENT)
Age: 33
End: 2023-08-21

## 2023-08-21 DIAGNOSIS — Z3A.01 LESS THAN 8 WEEKS GESTATION OF PREGNANCY: Primary | ICD-10-CM

## 2023-08-21 LAB
HCG, PREGNANCY, URINE, POC: POSITIVE
VALID INTERNAL CONTROL, POC: ABNORMAL

## 2023-08-21 RX ORDER — ONDANSETRON 4 MG/1
4 TABLET, FILM COATED ORAL 3 TIMES DAILY PRN
Qty: 15 TABLET | Refills: 0 | Status: SHIPPED | OUTPATIENT
Start: 2023-08-21

## 2023-08-21 ASSESSMENT — ENCOUNTER SYMPTOMS
GASTROINTESTINAL NEGATIVE: 1
RESPIRATORY NEGATIVE: 1
EYES NEGATIVE: 1
ALLERGIC/IMMUNOLOGIC NEGATIVE: 1

## 2023-08-21 NOTE — PROGRESS NOTES
Of Breath    Honey Hives and Itching       Current Outpatient Medications   Medication Sig Dispense Refill    ondansetron (ZOFRAN) 4 MG tablet Take 1 tablet by mouth 3 times daily as needed for Nausea or Vomiting 15 tablet 0    diphenhydrAMINE (BENADRYL) 25 MG capsule ceived the following from [facility]: Outside name: diphenhydrAMINE (BenadryL) 25 mg capsule      famotidine (PEPCID) 20 MG tablet Take 1 tablet by mouth 2 times daily      ascorbic acid (VITAMIN C) 250 MG tablet Take by mouth (Patient not taking: Reported on 8/21/2023)      aspirin 81 MG EC tablet ceived the following from [facility]: Outside name: aspirin delayed-release 81 mg tablet (Patient not taking: Reported on 8/21/2023)      ferrous sulfate (IRON 325) 325 (65 Fe) MG tablet Take by mouth every morning (before breakfast) (Patient not taking: Reported on 8/21/2023)      metoclopramide (REGLAN) 10 MG tablet ceived the following from [facility]: Outside name: metoclopramide HCl (Reglan) 10 mg tablet (Patient not taking: Reported on 8/21/2023)       No current facility-administered medications for this visit. History reviewed. No pertinent past medical history. History reviewed. No pertinent surgical history. Social History:   Social Connections: Not on file        Patient Care Team:  Juan Luis Arcos MD as PCP - General (Family Medicine)    Patient Active Problem List   Diagnosis    Prenatal care of multigravida, antepartum            I ADVISED PATIENT TO GO TO ER IF SYMPTOMS WORSEN , CHANGE OR FAILS TO IMPROVE. I have discussed the diagnosis with the patient and the intended plan as seen in the above orders. The patient has received an after-visit summary and questions were answered concerning future plans. I have discussed medication side effects and warnings with the patient as well. The patient agrees and understands above plan.        An electronic signature was used to

## 2023-09-06 ENCOUNTER — INITIAL PRENATAL (OUTPATIENT)
Age: 33
End: 2023-09-06

## 2023-09-06 VITALS — WEIGHT: 199 LBS | SYSTOLIC BLOOD PRESSURE: 105 MMHG | DIASTOLIC BLOOD PRESSURE: 69 MMHG | BODY MASS INDEX: 32.12 KG/M2

## 2023-09-06 DIAGNOSIS — O09.529 ANTEPARTUM MULTIGRAVIDA OF ADVANCED MATERNAL AGE: Primary | ICD-10-CM

## 2023-09-06 DIAGNOSIS — Z3A.01 7 WEEKS GESTATION OF PREGNANCY: ICD-10-CM

## 2023-09-06 DIAGNOSIS — Z98.891 H/O: C-SECTION: ICD-10-CM

## 2023-09-06 DIAGNOSIS — D21.9 MYOMA: ICD-10-CM

## 2023-09-06 LAB
ABO + RH BLD: NORMAL
BLOOD BANK CMNT PATIENT-IMP: NORMAL
BLOOD GROUP ANTIBODIES SERPL: NORMAL
ERYTHROCYTE [DISTWIDTH] IN BLOOD BY AUTOMATED COUNT: 14.8 % (ref 11.5–14.5)
HCT VFR BLD AUTO: 39.4 % (ref 35–47)
HGB BLD-MCNC: 12.6 G/DL (ref 11.5–16)
MCH RBC QN AUTO: 27.2 PG (ref 26–34)
MCHC RBC AUTO-ENTMCNC: 32 G/DL (ref 30–36.5)
MCV RBC AUTO: 85.1 FL (ref 80–99)
NRBC # BLD: 0 K/UL (ref 0–0.01)
NRBC BLD-RTO: 0 PER 100 WBC
PLATELET # BLD AUTO: 263 K/UL (ref 150–400)
PMV BLD AUTO: 11.7 FL (ref 8.9–12.9)
RBC # BLD AUTO: 4.63 M/UL (ref 3.8–5.2)
SPECIMEN EXP DATE BLD: NORMAL
WBC # BLD AUTO: 6.4 K/UL (ref 3.6–11)

## 2023-09-06 PROCEDURE — 0502F SUBSEQUENT PRENATAL CARE: CPT | Performed by: OBSTETRICS & GYNECOLOGY

## 2023-09-06 RX ORDER — FOLIC ACID 1 MG/1
1 TABLET ORAL DAILY
COMMUNITY

## 2023-09-06 RX ORDER — DOXYLAMINE SUCCINATE AND PYRIDOXINE HYDROCHLORIDE 20; 20 MG/1; MG/1
1 TABLET, EXTENDED RELEASE ORAL EVERY 12 HOURS
Qty: 60 TABLET | Refills: 3 | Status: SHIPPED | OUTPATIENT
Start: 2023-09-06

## 2023-09-06 NOTE — PROGRESS NOTES
[address] [facility]  [email or web address]  [email or web address]  [phone]    Dori Ansari MD, Kirstiepamviviane Rey      Chief complaint:  Irregular cycles  Last cycle; Patient's last menstrual period was 07/13/2023 (exact date). Carmen Cano is a 26 y.o. female presents for a new pregnancy visit and to establish routine OB care. She presents for the evaluation of irregular menses and a positive pregnancy test.  This is a new concern and a work up is planned. No bleeding, some nausea. Nervous due to complicated course last pregnancy. Per Rooming Note:    Carmen Cano is a 26 y.o. female presents for a new pregnancy visit and to establish routine OB care. Chief Complaint   Patient presents with    Initial Prenatal Visit         Last Pap smear: unknown  LMP history:  Patient's last menstrual period was 07/13/2023 (exact date). The date of her LMP is certain  Her last menstrual period was normal  Her menstrual cycle is regular. A urine pregnancy test was positive 4 weeks ago. She was not on contraception at time of conception. Based on her LMP, her EGA is 7 weeks 6 days giving an [facility] of 4/18/2024. Ultrasound data:  She had an ultrasound done by the ultrasound tech today which revealed a viable white pregnancy with a gestational age of 9 weeks and 6 days giving an [facility] of 4/18/2024. A SINGLE VIABLE 7W6D IUP IS SEEN WITH NORMAL CARDIAC RHYTHM. GESTATIONAL AGE BASED ON TODAY'S ULTRASOUND. A NORMAL YOLK SAC IS SEEN. THERE APPEARS TO BE A SUBCHORIONIC HEMORRHAGE THAT MEASURES 20 X 6 X 14MM. THERE APPEARS TO BE A POSTERIOR FIBROID THAT MEASURES 65 X 60 X 63MM. RIGHT ADNEXA APPEARS WITHIN NORMAL LIMITS. LEFT ADNEXA APPEARS WITHIN NORMAL LIMITS. NO FREE FLUID SEEN IN THE CDS. Pregnancy symptoms:  She reports fatigue  nausea. She denies breast tenderness.   She has not experienced  vomiting over the last few

## 2023-09-07 LAB
BACTERIA SPEC CULT: NORMAL
HBV SURFACE AG SER QL: <0.1 INDEX
HBV SURFACE AG SER QL: NEGATIVE
HCV AB SER IA-ACNC: 0.11 INDEX
HCV AB SERPL QL IA: NONREACTIVE
HIV 1+2 AB+HIV1 P24 AG SERPL QL IA: NONREACTIVE
HIV 1/2 RESULT COMMENT: NORMAL
RPR SER QL: NONREACTIVE
RUBV IGG SERPL IA-ACNC: NORMAL IU/ML
SERVICE CMNT-IMP: NORMAL

## 2023-09-07 NOTE — RESULT ENCOUNTER NOTE
PNL reviewed  Update PNL, if needed  Add any missing PN panel labs to [address] message sent if active  Add rh negative to PL please

## 2023-09-08 DIAGNOSIS — D21.9 MYOMA: ICD-10-CM

## 2023-09-08 DIAGNOSIS — Z98.891 H/O: C-SECTION: ICD-10-CM

## 2023-09-08 DIAGNOSIS — O09.529 ANTEPARTUM MULTIGRAVIDA OF ADVANCED MATERNAL AGE: Primary | ICD-10-CM

## 2023-09-08 DIAGNOSIS — Z34.80 PRENATAL CARE OF MULTIGRAVIDA, ANTEPARTUM: ICD-10-CM

## 2023-09-11 LAB
HGB A MFR BLD: 97.1 % (ref 96.4–98.8)
HGB A2 MFR BLD COLUMN CHROM: 2.9 % (ref 1.8–3.2)
HGB F MFR BLD: 0 % (ref 0–2)
HGB FRACT BLD-IMP: NORMAL
HGB S MFR BLD: 0 %

## 2023-09-12 LAB
C TRACH RRNA CVX QL NAA+PROBE: NEGATIVE
CYTOLOGIST CVX/VAG CYTO: NORMAL
CYTOLOGY CVX/VAG DOC CYTO: NORMAL
CYTOLOGY CVX/VAG DOC THIN PREP: NORMAL
DX ICD CODE: NORMAL
HPV I/H RISK 4 DNA CVX QL PROBE+SIG AMP: NEGATIVE
Lab: NORMAL
Lab: NORMAL
N GONORRHOEA RRNA CVX QL NAA+PROBE: NEGATIVE
OTHER STN SPEC: NORMAL
STAT OF ADQ CVX/VAG CYTO-IMP: NORMAL
T VAGINALIS RRNA SPEC QL NAA+PROBE: NEGATIVE

## 2023-10-02 SDOH — ECONOMIC STABILITY: TRANSPORTATION INSECURITY
IN THE PAST 12 MONTHS, HAS LACK OF TRANSPORTATION KEPT YOU FROM MEETINGS, WORK, OR FROM GETTING THINGS NEEDED FOR DAILY LIVING?: NO

## 2023-10-02 SDOH — ECONOMIC STABILITY: FOOD INSECURITY: WITHIN THE PAST 12 MONTHS, THE FOOD YOU BOUGHT JUST DIDN'T LAST AND YOU DIDN'T HAVE MONEY TO GET MORE.: NEVER TRUE

## 2023-10-02 SDOH — ECONOMIC STABILITY: INCOME INSECURITY: HOW HARD IS IT FOR YOU TO PAY FOR THE VERY BASICS LIKE FOOD, HOUSING, MEDICAL CARE, AND HEATING?: NOT HARD AT ALL

## 2023-10-02 SDOH — ECONOMIC STABILITY: HOUSING INSECURITY
IN THE LAST 12 MONTHS, WAS THERE A TIME WHEN YOU DID NOT HAVE A STEADY PLACE TO SLEEP OR SLEPT IN A SHELTER (INCLUDING NOW)?: NO

## 2023-10-02 SDOH — ECONOMIC STABILITY: FOOD INSECURITY: WITHIN THE PAST 12 MONTHS, YOU WORRIED THAT YOUR FOOD WOULD RUN OUT BEFORE YOU GOT MONEY TO BUY MORE.: NEVER TRUE

## 2023-10-05 ENCOUNTER — ROUTINE PRENATAL (OUTPATIENT)
Age: 33
End: 2023-10-05

## 2023-10-05 ENCOUNTER — TELEPHONE (OUTPATIENT)
Age: 33
End: 2023-10-05

## 2023-10-05 VITALS
WEIGHT: 203 LBS | SYSTOLIC BLOOD PRESSURE: 118 MMHG | DIASTOLIC BLOOD PRESSURE: 78 MMHG | BODY MASS INDEX: 32.77 KG/M2 | HEART RATE: 89 BPM

## 2023-10-05 DIAGNOSIS — Z98.891 H/O: C-SECTION: ICD-10-CM

## 2023-10-05 DIAGNOSIS — R00.2 PALPITATION: ICD-10-CM

## 2023-10-05 DIAGNOSIS — O09.529 ANTEPARTUM MULTIGRAVIDA OF ADVANCED MATERNAL AGE: Primary | ICD-10-CM

## 2023-10-05 DIAGNOSIS — Z3A.12 12 WEEKS GESTATION OF PREGNANCY: ICD-10-CM

## 2023-10-05 DIAGNOSIS — Z91.89 AT RISK FOR GESTATIONAL DIABETES MELLITUS: ICD-10-CM

## 2023-10-05 DIAGNOSIS — R10.32 LLQ PAIN: ICD-10-CM

## 2023-10-05 NOTE — TELEPHONE ENCOUNTER
Received called from patient's OB's office regarding referral placed for patient to be seen in our office. Looked in patient's chart and saw referral placed, but was not routed to our workqueue. Offered to schedule next available appt at [facility] next week. Was asked about next available appt at [facility], but advised that the next available wouldn't be within the time frame of when patient needed to be seen.

## 2023-10-05 NOTE — TELEPHONE ENCOUNTER
Called M & asked about scheduling pt for referral. We placed referral on 9/6/23 for 12-14wk NT & GC & 20wk FS. Pt is now 12w0d.  said she can see the referral but it must not have been in their work que. She could get pt in next Friday at [facility], asked if there was anything avail at [facility], there were no avail appts for the next ~2wk. I advised Dr. Radha Pond will refer pt else where.

## 2023-10-05 NOTE — PROGRESS NOTES
[address] [facility]  [email or web address]  [phone]    Aamir Martinez MD, FACOG    OB/GYN: [facility] Problem visit    Chief Complaint:   Chief Complaint   Patient presents with    Routine Prenatal Visit         Pt co LLQ for one day  Nothing makes it better or worse. No bowel changes  No bleeding    Also co palpations x 2 months. Had in past but no work up. Symptoms are getting more frequent and longer in duration  No persistent CP/SOB        PFSH:  Past Medical History:   Diagnosis Date    Pap smear for cervical cancer screening 09/06/2023    normal; HPV not tested     History reviewed. No pertinent surgical history. Family History   Problem Relation Age of Onset    Hypertension Maternal Grandmother      Social History     Tobacco Use    Smoking status: Never    Smokeless tobacco: Never   Vaping Use    Vaping Use: Never used   Substance Use Topics    Alcohol use: Not Currently     Comment: ocassionally- socially and stopped last week    Drug use: Never     Allergies   Allergen Reactions    Codeine Anaphylaxis, Hives and Shortness Of Breath    Honey Hives and Itching     Current Outpatient Medications   Medication Sig    folic acid (FOLVITE) 1 MG tablet Take 1 tablet by mouth daily    Prenatal Vit-Fe Fumarate-FA (PRENATAL 1+1 PO) Take by mouth    Doxylamine-Pyridoxine ER (BONJESTA) 20-20 MG TBCR Take 1 tablet by mouth in the morning and 1 tablet in the evening. Start with one tablet po qd and increase to q tab po q12 hrs prn n/v.. No current facility-administered medications for this visit.        Review of Systems:  History obtained from the patient and written ROS questionnaire  Constitutional: negative for fevers, chills and weight loss  ENT ROS: negative for - hearing change, oral lesions or visual changes  Respiratory: negative for cough, wheezing or dyspnea on exertion  Cardiovascular: negative for chest pain, irregular heart beats, exertional chest

## 2023-10-06 ENCOUNTER — TELEPHONE (OUTPATIENT)
Age: 33
End: 2023-10-06

## 2023-10-06 DIAGNOSIS — Z36.9 UNSPECIFIED ANTENATAL SCREENING: Primary | ICD-10-CM

## 2023-10-06 LAB
GLUCOSE 1H P 100 G GLC PO SERPL-MCNC: 129 MG/DL (ref 65–140)
TSH SERPL DL<=0.05 MIU/L-ACNC: 1.22 UIU/ML (ref 0.36–3.74)

## 2023-10-06 NOTE — TELEPHONE ENCOUNTER
PT name and  verified    25 yo - 12w1d, last ov 10/5/23, next ov 23     [facility] calling to ask why PT was referred, relayed PT had Panorama and Horizon done and TSH and GTT ordered. Consulted with MW and per  PT has hx of  section, AMA, gestaional diabetes, myoma, abnormal screenings. PT needs genetic counseling and 20 week us scheduled. Instructed if they needed anymore information to call back, PT at appt now.

## 2023-10-06 NOTE — TELEPHONE ENCOUNTER
Two patient identifiers used    26year old GeP0 12w1d pregnant    Patient calling to ask why she is being seen at [facility]    Patient was advised due to [facility] and abnormal glucose screening in previous pregnancy,and previous     Patient was called back after speaking to MD to advised of referral due to [facility]    Patient verbalized understanding.

## 2023-10-06 NOTE — RESULT ENCOUNTER NOTE
Normal OB results (or with acceptable variants)  MC message sent if active  Update PNL, if needed  Add to PL: TSH wnl w date  12w1d

## 2023-10-15 LAB
Lab: NEGATIVE
Lab: NORMAL
NTRA ALPHA-THALASSEMIA: NEGATIVE
NTRA BETA-HEMOGLOBINOPATHIES: NEGATIVE
NTRA CANAVAN DISEASE: NEGATIVE
NTRA CYSTIC FIBROSIS: NEGATIVE
NTRA DUCHENNE/BECKER MUSCULAR DYSTROPHY: NEGATIVE
NTRA FAMILIAL DYSAUTONOMIA: NEGATIVE
NTRA FRAGILE X SYNDROME: NEGATIVE
NTRA GALACTOSEMIA: NEGATIVE
NTRA GAUCHER DISEASE: NEGATIVE
NTRA MEDIUM CHAIN ACYL-COA DEHYDROGENASE DEFICIENCY: NEGATIVE
NTRA POLYCYSTIC KIDNEY DISEASE, AUTOSOMAL RECESSIVE: NEGATIVE
NTRA SMITH-LEMLI-OPITZ SYNDROME: NEGATIVE
NTRA SPINAL MUSCULAR ATROPHY: NEGATIVE
NTRA TAY-SACHS DISEASE: NEGATIVE

## 2023-10-18 ENCOUNTER — TELEPHONE (OUTPATIENT)
Age: 33
End: 2023-10-18

## 2023-10-18 DIAGNOSIS — O09.529 SUPERVISION OF MULTIGRAVIDA OF ADVANCED MATERNAL AGE, ANTEPARTUM: ICD-10-CM

## 2023-10-24 NOTE — TELEPHONE ENCOUNTER
Called [facility] regarding report from 10/6/23    Pt was referred for a 12-14wk NT scan & 20wk fetal scan - also requested genetic counseling  Dx: AMA, hx of myoma, hx of , hx of gestational diabetes     Spoke with [place] who checked with their Al Poisson, reports genetic counseling was not preformed on 10/6/23 nor was the NT. [facility] does not see the referral in their system.      [facility] will have Jeferson Lawton call me to discuss       Next visit for pt is scheduled for 23 at 9:15am at [facility] (updated PL)

## 2023-11-02 ENCOUNTER — ROUTINE PRENATAL (OUTPATIENT)
Age: 33
End: 2023-11-02

## 2023-11-02 VITALS
BODY MASS INDEX: 33.11 KG/M2 | SYSTOLIC BLOOD PRESSURE: 118 MMHG | DIASTOLIC BLOOD PRESSURE: 78 MMHG | HEIGHT: 66 IN | HEART RATE: 78 BPM | WEIGHT: 206 LBS

## 2023-11-02 DIAGNOSIS — Z98.891 H/O: C-SECTION: ICD-10-CM

## 2023-11-02 DIAGNOSIS — O09.529 SUPERVISION OF MULTIGRAVIDA OF ADVANCED MATERNAL AGE, ANTEPARTUM: Primary | ICD-10-CM

## 2023-11-02 NOTE — PROGRESS NOTES
[address] [facility]  [email or web address]  [phone]  Dori Ansari MD, FACOG      Routine follow-up OB visit     Chief Complaint   Patient presents with    Routine Prenatal Visit       Patient Active Problem List    Diagnosis Date Noted    Supervision of multigravida of advanced maternal age, antepartum 2023    Prenatal care of multigravida, antepartum 2022       SUBJECTIVE:  Pt reports doing well, nausea better with bonjesta. OBJECTIVE:  Vitals:    23 0902   BP: 118/78   Pulse: 78     See prenatal flowsheet    Physical Exam:  Gen: no acute distress, normal speech  Cardiac: appears warm and well perfused  Pulm: breathing comfortably on room air  Abd: soft, gravid, non-tender  Fundal height: see prenatal flowsheet  Ext: symmetric; moves all 4 extremities equally    Assessment:  26 y.o. [id number] 16w0d   Routine prenatal visit  Encounter Diagnoses   Name Primary? Supervision of multigravida of advanced maternal age, antepartum Yes    H/O:         Plan:   Routine follow up OB appointment  Continue routine prenatal care   Routine OB instructions given appropriate to gestational age, see AVS  Plan FS  AFP  Rec flu vaccine, pt considering    Orders Placed This Encounter   Procedures    Alpha Fetoprotein, Maternal     Standing Status:   Future     Standing Expiration Date:   10/31/2024     Order Specific Question:   Is this patient insulin dependent? (Required)     Answer:   No     Order Specific Question:   Patient Weight  (lbs)          (Required)     Answer:   0     Order Specific Question:   Gestational Age (weeks)? (Required)     Answer:   12     Order Specific Question:   Gestational Age Calculation? (Required)     Answer:   10/31/2023     Order Specific Question:   Number of Fetuses? (Required)     Answer:   1     Order Specific Question:   Donor Egg ?           (Required)     Answer:   N     Order Specific Question:   AFP

## 2023-11-03 ENCOUNTER — TELEPHONE (OUTPATIENT)
Age: 33
End: 2023-11-03

## 2023-11-03 NOTE — TELEPHONE ENCOUNTER
Francesca Quiles from [address] [place] [address] called office stating that patient was scheduled for two fetal scan appointments. One on 11/30/2023 at 9:15am and one for 12/1/202 at 2:15pm. They have been unable to reach patient to confirm appointments. Writer sent [employer] message to patient and patient respondedstating that she wanted to keep the MFM appointment on 12/1/2023 at 2:15.  Writer called MFM to confirm appointment

## 2023-11-03 NOTE — TELEPHONE ENCOUNTER
Pino Ramsey calling from [facility] saying pt was scheduled for two 20wk scans    12/1/23 at  at 2:15pm or 11/30/23 at 2:15pm at [address] [place] [address]    They were having a hard time contacting pt to confirm appt, asked us to reach out to pt.       Pino Ramsey noted that pt was already scheduled when Dr. Maris Baker & her talked the other day but Pino Ramesy was looking in the wrong spot for the appt    [facility] message sent to pt by Prince Davin LPN

## 2023-11-04 LAB
AFP INTERP SERPL-IMP: NORMAL
AFP INTERP SERPL-IMP: NORMAL
AFP MOM SERPL: 1
AFP SERPL-MCNC: 32.1 NG/ML
AGE AT DELIVERY: 35.8 YR
COMMENT: NORMAL
GA METHOD: NORMAL
GA: 16.3 WEEKS
IDDM PATIENT QL: NO
Lab: NORMAL
MULTIPLE PREGNANCY: NO
NEURAL TUBE DEFECT RISK FETUS: NORMAL %

## 2023-11-30 ENCOUNTER — ROUTINE PRENATAL (OUTPATIENT)
Age: 33
End: 2023-11-30

## 2023-11-30 VITALS — SYSTOLIC BLOOD PRESSURE: 115 MMHG | WEIGHT: 211.4 LBS | DIASTOLIC BLOOD PRESSURE: 76 MMHG | BODY MASS INDEX: 34.12 KG/M2

## 2023-11-30 DIAGNOSIS — O09.529 ANTEPARTUM MULTIGRAVIDA OF ADVANCED MATERNAL AGE: ICD-10-CM

## 2023-11-30 DIAGNOSIS — Z34.80 PRENATAL CARE OF MULTIGRAVIDA, ANTEPARTUM: Primary | ICD-10-CM

## 2023-11-30 DIAGNOSIS — Z3A.20 20 WEEKS GESTATION OF PREGNANCY: ICD-10-CM

## 2023-11-30 PROCEDURE — 0502F SUBSEQUENT PRENATAL CARE: CPT | Performed by: OBSTETRICS & GYNECOLOGY

## 2023-11-30 RX ORDER — DOXYLAMINE SUCCINATE AND PYRIDOXINE HYDROCHLORIDE 20; 20 MG/1; MG/1
1 TABLET, EXTENDED RELEASE ORAL EVERY 12 HOURS
Qty: 60 TABLET | Refills: 3 | Status: SHIPPED | OUTPATIENT
Start: 2023-11-30

## 2023-11-30 NOTE — PROGRESS NOTES
[address] [facility]  [email or web address]  [phone]  Jair Jenkins MD, FACOG      Routine follow-up OB visit     Chief Complaint   Patient presents with    Routine Prenatal Visit       Patient Active Problem List    Diagnosis Date Noted    Supervision of multigravida of advanced maternal age, antepartum 09/08/2023    Prenatal care of multigravida, antepartum 02/18/2022       SUBJECTIVE:  Pt reports still some nausea. Co low back pain and occ ha     OBJECTIVE:  Vitals:    11/30/23 0910   BP: 115/76     See prenatal flowsheet    Physical Exam:  Gen: no acute distress, normal speech  Cardiac: appears warm and well perfused  Pulm: breathing comfortably on room air  Abd: soft, gravid, non-tender  Fundal height: see prenatal flowsheet  Ext: symmetric; moves all 4 extremities equally    Assessment:  26 y.o. [id number] 20w0d   Routine prenatal visit  Encounter Diagnoses   Name Primary? Prenatal care of multigravida, antepartum Yes    Antepartum multigravida of advanced maternal age     25 weeks gestation of pregnancy        Plan:   Routine follow up OB appointment  Continue routine prenatal care   Routine OB instructions given appropriate to gestational age, see AVS  Rec support belt, consider PT if NI  Tyl prn HA, notify MD if ni    No orders of the defined types were placed in this encounter. No follow-ups on file. On this date of service, I have spent greater than 10 minutes reviewing the patient's previous notes, test results and planned follow up as well as performing documentation related to this visit. More than 50% of this time was spent face to face with the patient discussing the plan of care, diagnosis and importance of compliance with the prenatal care and physician recommendations as well as answering any questions.      [] SAB/bleeding precautions reviewed   [] PTL/PPROM precautions reviewed   [] Labor precautions reviewed   [] Fetal kick counts discussed   [] Labs

## 2023-12-28 ENCOUNTER — ROUTINE PRENATAL (OUTPATIENT)
Age: 33
End: 2023-12-28

## 2023-12-28 VITALS — WEIGHT: 214 LBS | DIASTOLIC BLOOD PRESSURE: 76 MMHG | SYSTOLIC BLOOD PRESSURE: 120 MMHG | BODY MASS INDEX: 34.54 KG/M2

## 2023-12-28 DIAGNOSIS — Z98.891 H/O: C-SECTION: ICD-10-CM

## 2023-12-28 DIAGNOSIS — O09.522 MULTIGRAVIDA OF ADVANCED MATERNAL AGE IN SECOND TRIMESTER: Primary | ICD-10-CM

## 2023-12-28 DIAGNOSIS — Z3A.24 24 WEEKS GESTATION OF PREGNANCY: ICD-10-CM

## 2023-12-28 PROCEDURE — 0502F SUBSEQUENT PRENATAL CARE: CPT | Performed by: OBSTETRICS & GYNECOLOGY

## 2023-12-28 NOTE — PROGRESS NOTES
[address] [facility]  [email or web address]  [phone]  Aamir Martinez MD, FACOG      Routine follow-up OB visit     Chief Complaint   Patient presents with    Routine Prenatal Visit       Patient Active Problem List    Diagnosis Date Noted    Supervision of multigravida of advanced maternal age, antepartum 09/08/2023    Prenatal care of multigravida, antepartum 02/18/2022       SUBJECTIVE:  Pt reports doing ok, still taking bonjesta: helping     OBJECTIVE:  Vitals:    12/28/23 0926   BP: 120/76     See prenatal flowsheet    Physical Exam:  Gen: no acute distress, normal speech  Cardiac: appears warm and well perfused  Pulm: breathing comfortably on room air  Abd: soft, gravid, non-tender  Fundal height: see prenatal flowsheet  Ext: symmetric; moves all 4 extremities equally    Assessment:  26 y.o. [id number] 24w0d   Routine prenatal visit  Encounter Diagnoses   Name Primary? 24 weeks gestation of pregnancy     Multigravida of advanced maternal age in second trimester Yes       Plan:   Routine follow up OB appointment  Continue routine prenatal care   Routine OB instructions given appropriate to gestational age, see AVS  Disc labs/consent/rhoga nv, and will discuss tdap in more detail    No orders of the defined types were placed in this encounter. Return in about 4 weeks (around 1/25/2024) for TP FOB. On this date of service, I have spent greater than 10 minutes reviewing the patient's previous notes, test results and planned follow up as well as performing documentation related to this visit. More than 50% of this time was spent face to face with the patient discussing the plan of care, diagnosis and importance of compliance with the prenatal care and physician recommendations as well as answering any questions.      [] SAB/bleeding precautions reviewed   [] PTL/PPROM precautions reviewed   [] Labor precautions reviewed   [] Fetal kick counts discussed   [] Labs reviewed with

## 2024-01-29 ENCOUNTER — ROUTINE PRENATAL (OUTPATIENT)
Age: 34
End: 2024-01-29
Payer: COMMERCIAL

## 2024-01-29 VITALS
HEART RATE: 99 BPM | SYSTOLIC BLOOD PRESSURE: 106 MMHG | BODY MASS INDEX: 35.1 KG/M2 | WEIGHT: 218.4 LBS | HEIGHT: 66 IN | DIASTOLIC BLOOD PRESSURE: 73 MMHG

## 2024-01-29 DIAGNOSIS — Z67.91 RH NEGATIVE STATE IN ANTEPARTUM PERIOD: ICD-10-CM

## 2024-01-29 DIAGNOSIS — Z11.3 VENEREAL DISEASE SCREENING: ICD-10-CM

## 2024-01-29 DIAGNOSIS — O26.899 RH NEGATIVE STATE IN ANTEPARTUM PERIOD: ICD-10-CM

## 2024-01-29 DIAGNOSIS — O09.529 SUPERVISION OF ELDERLY MULTIGRAVIDA, ANTEPARTUM: ICD-10-CM

## 2024-01-29 DIAGNOSIS — Z29.13 NEED FOR RHOGAM DUE TO RH NEGATIVE MOTHER: ICD-10-CM

## 2024-01-29 DIAGNOSIS — Z23 NEED FOR TDAP VACCINATION: Primary | ICD-10-CM

## 2024-01-29 LAB
BLOOD BANK CMNT PATIENT-IMP: NORMAL
BLOOD GROUP ANTIBODIES SERPL: NORMAL

## 2024-01-29 PROCEDURE — 96372 THER/PROPH/DIAG INJ SC/IM: CPT | Performed by: OBSTETRICS & GYNECOLOGY

## 2024-01-29 PROCEDURE — 90715 TDAP VACCINE 7 YRS/> IM: CPT | Performed by: OBSTETRICS & GYNECOLOGY

## 2024-01-29 PROCEDURE — 90471 IMMUNIZATION ADMIN: CPT | Performed by: OBSTETRICS & GYNECOLOGY

## 2024-01-29 PROCEDURE — 0502F SUBSEQUENT PRENATAL CARE: CPT | Performed by: OBSTETRICS & GYNECOLOGY

## 2024-01-29 ASSESSMENT — PATIENT HEALTH QUESTIONNAIRE - PHQ9
3. TROUBLE FALLING OR STAYING ASLEEP: NOT AT ALL
4. FEELING TIRED OR HAVING LITTLE ENERGY: NEARLY EVERY DAY
4. FEELING TIRED OR HAVING LITTLE ENERGY: 3
SUM OF ALL RESPONSES TO PHQ QUESTIONS 1-9: 4
10. IF YOU CHECKED OFF ANY PROBLEMS, HOW DIFFICULT HAVE THESE PROBLEMS MADE IT FOR YOU TO DO YOUR WORK, TAKE CARE OF THINGS AT HOME, OR GET ALONG WITH OTHER PEOPLE: 0
6. FEELING BAD ABOUT YOURSELF - OR THAT YOU ARE A FAILURE OR HAVE LET YOURSELF OR YOUR FAMILY DOWN: 0
9. THOUGHTS THAT YOU WOULD BE BETTER OFF DEAD, OR OF HURTING YOURSELF: 0
5. POOR APPETITE OR OVEREATING: 1
1. LITTLE INTEREST OR PLEASURE IN DOING THINGS: 0
8. MOVING OR SPEAKING SO SLOWLY THAT OTHER PEOPLE COULD HAVE NOTICED. OR THE OPPOSITE, BEING SO FIGETY OR RESTLESS THAT YOU HAVE BEEN MOVING AROUND A LOT MORE THAN USUAL: 0
6. FEELING BAD ABOUT YOURSELF - OR THAT YOU ARE A FAILURE OR HAVE LET YOURSELF OR YOUR FAMILY DOWN: NOT AT ALL
7. TROUBLE CONCENTRATING ON THINGS, SUCH AS READING THE NEWSPAPER OR WATCHING TELEVISION: 0
2. FEELING DOWN, DEPRESSED OR HOPELESS: 0
5. POOR APPETITE OR OVEREATING: SEVERAL DAYS
3. TROUBLE FALLING OR STAYING ASLEEP: 0
SUM OF ALL RESPONSES TO PHQ QUESTIONS 1-9: 4
8. MOVING OR SPEAKING SO SLOWLY THAT OTHER PEOPLE COULD HAVE NOTICED. OR THE OPPOSITE - BEING SO FIDGETY OR RESTLESS THAT YOU HAVE BEEN MOVING AROUND A LOT MORE THAN USUAL: NOT AT ALL
SUM OF ALL RESPONSES TO PHQ QUESTIONS 1-9: 4
9. THOUGHTS THAT YOU WOULD BE BETTER OFF DEAD, OR OF HURTING YOURSELF: NOT AT ALL
10. IF YOU CHECKED OFF ANY PROBLEMS, HOW DIFFICULT HAVE THESE PROBLEMS MADE IT FOR YOU TO DO YOUR WORK, TAKE CARE OF THINGS AT HOME, OR GET ALONG WITH OTHER PEOPLE: NOT DIFFICULT AT ALL
1. LITTLE INTEREST OR PLEASURE IN DOING THINGS: NOT AT ALL
7. TROUBLE CONCENTRATING ON THINGS, SUCH AS READING THE NEWSPAPER OR WATCHING TELEVISION: NOT AT ALL
SUM OF ALL RESPONSES TO PHQ QUESTIONS 1-9: 4
SUM OF ALL RESPONSES TO PHQ QUESTIONS 1-9: 4
2. FEELING DOWN, DEPRESSED OR HOPELESS: NOT AT ALL
SUM OF ALL RESPONSES TO PHQ9 QUESTIONS 1 & 2: 0

## 2024-01-29 NOTE — PROGRESS NOTES
[facility]  [email or web address]  [phone]  Stacey Fung MD, FACOG      Routine follow-up OB visit     Chief Complaint   Patient presents with    Routine Prenatal Visit       Patient Active Problem List    Diagnosis Date Noted    H/O:  2023    Multigravida of advanced maternal age in second trimester 2023    Supervision of multigravida of advanced maternal age, antepartum 2023    Prenatal care of multigravida, antepartum 2022       SUBJECTIVE:  Pt reports getting more uncomfortable,  Active fetus       OBJECTIVE:  Vitals:    24 1117   BP: 106/73   Pulse: 99     See prenatal flowsheet    Physical Exam:  Gen: no acute distress, normal speech  Cardiac: appears warm and well perfused  Pulm: breathing comfortably on room air  Abd: soft, gravid, non-tender  Fundal height: see prenatal flowsheet  Ext: symmetric; moves all 4 extremities equally    Assessment:  33 y.o.  28w4d   Routine prenatal visit  Encounter Diagnoses   Name Primary?    Need for Tdap vaccination Yes    Need for rhogam due to Rh negative mother     Supervision of elderly multigravida, antepartum     Venereal disease screening     Rh negative state in antepartum period        Plan:   Routine follow up OB appointment  Continue routine prenatal care   Routine OB instructions given appropriate to gestational age, see AVS  Desires r cs  Rec gentle exercise, consider support belt   Cs consent reviewed for office and LD    Orders Placed This Encounter   Procedures    Tdap, BOOSTRIX, (age 10 yrs+), IM    RPR     Standing Status:   Future     Standing Expiration Date:   2025    CBC     Standing Status:   Future     Standing Expiration Date:   2025    Glucose Tolerance Gestational, 1 Hour     Standing Status:   Future     Standing Expiration Date:   2025    Antibody Screen     Standing Status:   Future     Standing Expiration Date:   2025        No follow-ups on

## 2024-01-29 NOTE — PROGRESS NOTES
After obtaining consent, and per orders of Dr. REDDY, injection tdap given in Left deltoid by Bart Berman LPN. Patient instructed to remain in clinic for 20 minutes afterwards, and to report any adverse reaction to me immediately. Pt getting labs drawn at 12:27pm

## 2024-01-29 NOTE — PROGRESS NOTES
After obtaining consent, and per orders of Dr. Fung, injection of Rhogam given in Right upper quad. gluteus by Amena Stein MA. Patient instructed to remain in clinic for 20 minutes afterwards, and to report any adverse reaction to me immediately.

## 2024-01-30 LAB
ERYTHROCYTE [DISTWIDTH] IN BLOOD BY AUTOMATED COUNT: 14.7 % (ref 11.5–14.5)
GLUCOSE 1H P 100 G GLC PO SERPL-MCNC: 154 MG/DL (ref 65–140)
HCT VFR BLD AUTO: 35.3 % (ref 35–47)
HGB BLD-MCNC: 11.6 G/DL (ref 11.5–16)
MCH RBC QN AUTO: 27.9 PG (ref 26–34)
MCHC RBC AUTO-ENTMCNC: 32.9 G/DL (ref 30–36.5)
MCV RBC AUTO: 84.9 FL (ref 80–99)
NRBC # BLD: 0 K/UL (ref 0–0.01)
NRBC BLD-RTO: 0 PER 100 WBC
PLATELET # BLD AUTO: 222 K/UL (ref 150–400)
PMV BLD AUTO: 12.1 FL (ref 8.9–12.9)
RBC # BLD AUTO: 4.16 M/UL (ref 3.8–5.2)
RPR SER QL: NONREACTIVE
WBC # BLD AUTO: 9 K/UL (ref 3.6–11)

## 2024-01-31 NOTE — RESULT ENCOUNTER NOTE
Normal OB results (or with acceptable variants)  MC message sent if active  Update PNL, if needed  Glucola still pending

## 2024-01-31 NOTE — RESULT ENCOUNTER NOTE
Elev 1 hr   add to PL; elevated glucola  Rec 3hr asap  Notify pt if not scheduled within 1 week or MC message not read

## 2024-02-08 ENCOUNTER — NURSE ONLY (OUTPATIENT)
Age: 34
End: 2024-02-08

## 2024-02-08 ENCOUNTER — ROUTINE PRENATAL (OUTPATIENT)
Age: 34
End: 2024-02-08

## 2024-02-08 VITALS
WEIGHT: 219.6 LBS | SYSTOLIC BLOOD PRESSURE: 117 MMHG | HEART RATE: 89 BPM | BODY MASS INDEX: 35.29 KG/M2 | HEIGHT: 66 IN | DIASTOLIC BLOOD PRESSURE: 76 MMHG

## 2024-02-08 DIAGNOSIS — Z34.80 PRENATAL CARE OF MULTIGRAVIDA, ANTEPARTUM: Primary | ICD-10-CM

## 2024-02-08 DIAGNOSIS — R73.09 ELEVATED GLUCOSE TOLERANCE TEST: ICD-10-CM

## 2024-02-08 DIAGNOSIS — Z3A.30 30 WEEKS GESTATION OF PREGNANCY: ICD-10-CM

## 2024-02-08 DIAGNOSIS — O09.529 SUPERVISION OF ELDERLY MULTIGRAVIDA, ANTEPARTUM: ICD-10-CM

## 2024-02-08 DIAGNOSIS — L29.9 PRURITUS OF PREGNANCY IN THIRD TRIMESTER: ICD-10-CM

## 2024-02-08 DIAGNOSIS — Z91.89 AT RISK FOR GESTATIONAL DIABETES MELLITUS: Primary | ICD-10-CM

## 2024-02-08 DIAGNOSIS — O99.713 PRURITUS OF PREGNANCY IN THIRD TRIMESTER: ICD-10-CM

## 2024-02-08 DIAGNOSIS — O99.810 ABNORMAL MATERNAL GLUCOSE TOLERANCE, ANTEPARTUM: ICD-10-CM

## 2024-02-08 PROCEDURE — 0502F SUBSEQUENT PRENATAL CARE: CPT | Performed by: OBSTETRICS & GYNECOLOGY

## 2024-02-08 ASSESSMENT — PATIENT HEALTH QUESTIONNAIRE - PHQ9
SUM OF ALL RESPONSES TO PHQ QUESTIONS 1-9: 3
SUM OF ALL RESPONSES TO PHQ9 QUESTIONS 1 & 2: 0
6. FEELING BAD ABOUT YOURSELF - OR THAT YOU ARE A FAILURE OR HAVE LET YOURSELF OR YOUR FAMILY DOWN: NOT AT ALL
SUM OF ALL RESPONSES TO PHQ QUESTIONS 1-9: 3
3. TROUBLE FALLING OR STAYING ASLEEP: NOT AT ALL
SUM OF ALL RESPONSES TO PHQ QUESTIONS 1-9: 3
3. TROUBLE FALLING OR STAYING ASLEEP: 0
SUM OF ALL RESPONSES TO PHQ QUESTIONS 1-9: 3
1. LITTLE INTEREST OR PLEASURE IN DOING THINGS: 0
7. TROUBLE CONCENTRATING ON THINGS, SUCH AS READING THE NEWSPAPER OR WATCHING TELEVISION: 0
2. FEELING DOWN, DEPRESSED OR HOPELESS: NOT AT ALL
10. IF YOU CHECKED OFF ANY PROBLEMS, HOW DIFFICULT HAVE THESE PROBLEMS MADE IT FOR YOU TO DO YOUR WORK, TAKE CARE OF THINGS AT HOME, OR GET ALONG WITH OTHER PEOPLE: NOT DIFFICULT AT ALL
6. FEELING BAD ABOUT YOURSELF - OR THAT YOU ARE A FAILURE OR HAVE LET YOURSELF OR YOUR FAMILY DOWN: 0
2. FEELING DOWN, DEPRESSED OR HOPELESS: 0
5. POOR APPETITE OR OVEREATING: MORE THAN HALF THE DAYS
4. FEELING TIRED OR HAVING LITTLE ENERGY: SEVERAL DAYS
10. IF YOU CHECKED OFF ANY PROBLEMS, HOW DIFFICULT HAVE THESE PROBLEMS MADE IT FOR YOU TO DO YOUR WORK, TAKE CARE OF THINGS AT HOME, OR GET ALONG WITH OTHER PEOPLE: 0
4. FEELING TIRED OR HAVING LITTLE ENERGY: 1
8. MOVING OR SPEAKING SO SLOWLY THAT OTHER PEOPLE COULD HAVE NOTICED. OR THE OPPOSITE, BEING SO FIGETY OR RESTLESS THAT YOU HAVE BEEN MOVING AROUND A LOT MORE THAN USUAL: 0
9. THOUGHTS THAT YOU WOULD BE BETTER OFF DEAD, OR OF HURTING YOURSELF: NOT AT ALL
1. LITTLE INTEREST OR PLEASURE IN DOING THINGS: NOT AT ALL
5. POOR APPETITE OR OVEREATING: 2
8. MOVING OR SPEAKING SO SLOWLY THAT OTHER PEOPLE COULD HAVE NOTICED. OR THE OPPOSITE - BEING SO FIDGETY OR RESTLESS THAT YOU HAVE BEEN MOVING AROUND A LOT MORE THAN USUAL: NOT AT ALL
9. THOUGHTS THAT YOU WOULD BE BETTER OFF DEAD, OR OF HURTING YOURSELF: 0
SUM OF ALL RESPONSES TO PHQ QUESTIONS 1-9: 3
7. TROUBLE CONCENTRATING ON THINGS, SUCH AS READING THE NEWSPAPER OR WATCHING TELEVISION: NOT AT ALL

## 2024-02-08 NOTE — PROGRESS NOTES
[facility]  [email or web address]  [phone]  Stacey Fung MD, FACOG      Routine follow-up OB visit     Chief Complaint   Patient presents with    Routine Prenatal Visit     3 hour glucose test       Patient Active Problem List    Diagnosis Date Noted    H/O:  2023    Multigravida of advanced maternal age in second trimester 2023    Supervision of multigravida of advanced maternal age, antepartum 2023    Prenatal care of multigravida, antepartum 2022       SUBJECTIVE:  Pt reports active fetus, more cramping even when not working.  Doing 3hr today.  Also co inc itching over last week or so, had in prior pregnancy.  More GERD sx on pepcid, did well on protonix in past.      OBJECTIVE:  Vitals:    24 0835   BP: 117/76   Pulse: 89     See prenatal flowsheet    Physical Exam:  Gen: no acute distress, normal speech  Cardiac: appears warm and well perfused  Pulm: breathing comfortably on room air  Abd: soft, gravid, non-tender  Fundal height: see prenatal flowsheet  Ext: symmetric; moves all 4 extremities equally    Assessment:  33 y.o.  30w0d   Routine prenatal visit  Encounter Diagnoses   Name Primary?    Prenatal care of multigravida, antepartum Yes    Supervision of elderly multigravida, antepartum     30 weeks gestation of pregnancy     Abnormal maternal glucose tolerance, antepartum     Pruritus of pregnancy in third trimester        Plan:   Routine follow up OB appointment  Continue routine prenatal care   Routine OB instructions given appropriate to gestational age, see AVS  Disc causes of itching in pregnancy and management options:  Add cmp/bile acids: disc risks of cholestasis vs pupps (benign)  Notify MD if NI  3 hr  Disc options for GERD: add protonix if needed (pt has taken in past)  Viral precautions/bowel regimen prn: Inc hydration, notify MD if NI    Orders Placed This Encounter   Procedures    Bile Acids, Total     Standing

## 2024-02-09 LAB
ALBUMIN SERPL-MCNC: 3 G/DL (ref 3.5–5)
ALBUMIN/GLOB SERPL: 0.8 (ref 1.1–2.2)
ALP SERPL-CCNC: 138 U/L (ref 45–117)
ALT SERPL-CCNC: 14 U/L (ref 12–78)
ANION GAP SERPL CALC-SCNC: 4 MMOL/L (ref 5–15)
AST SERPL-CCNC: 12 U/L (ref 15–37)
BILIRUB SERPL-MCNC: 0.5 MG/DL (ref 0.2–1)
BUN SERPL-MCNC: 5 MG/DL (ref 6–20)
BUN/CREAT SERPL: 6 (ref 12–20)
CALCIUM SERPL-MCNC: 9.1 MG/DL (ref 8.5–10.1)
CHLORIDE SERPL-SCNC: 105 MMOL/L (ref 97–108)
CO2 SERPL-SCNC: 27 MMOL/L (ref 21–32)
CREAT SERPL-MCNC: 0.86 MG/DL (ref 0.55–1.02)
GESTATIONAL 3HR GTT: ABNORMAL
GLOBULIN SER CALC-MCNC: 3.6 G/DL (ref 2–4)
GLUCOSE 1H P 100 G GLC PO SERPL-MCNC: 172 MG/DL (ref 65–180)
GLUCOSE 2 HOUR: 157 MG/DL (ref 65–155)
GLUCOSE P FAST SERPL-MCNC: 103 MG/DL (ref 65–95)
GLUCOSE SERPL-MCNC: 183 MG/DL (ref 65–100)
GLUCOSE, 3 HOUR: 107 MG/DL (ref 65–140)
POTASSIUM SERPL-SCNC: 4.1 MMOL/L (ref 3.5–5.1)
PROT SERPL-MCNC: 6.6 G/DL (ref 6.4–8.2)
SODIUM SERPL-SCNC: 136 MMOL/L (ref 136–145)

## 2024-02-10 LAB — BILE AC SERPL-SCNC: 6.9 UMOL/L (ref 0–10)

## 2024-02-10 NOTE — RESULT ENCOUNTER NOTE
Cw GDM  Add to PL: GDM  Refer to MFM for GDM management and serial growth US  (Or refer to endo for gdm if pt prefers)  Refer to diabetes teaching/nutrition counseling asap, if available

## 2024-02-13 ENCOUNTER — TELEPHONE (OUTPATIENT)
Age: 34
End: 2024-02-13

## 2024-02-14 DIAGNOSIS — O24.419 GESTATIONAL DIABETES MELLITUS (GDM), ANTEPARTUM, GESTATIONAL DIABETES METHOD OF CONTROL UNSPECIFIED: Primary | ICD-10-CM

## 2024-02-14 DIAGNOSIS — O09.529 SUPERVISION OF ELDERLY MULTIGRAVIDA, ANTEPARTUM: ICD-10-CM

## 2024-02-21 NOTE — TELEPHONE ENCOUNTER
Calling [facility] to update them on GDM dx & pt needs to be seen for serial growth u/s.    Spoke with Elissa, they req an updated referral with new dx code & gave me fax number: [phone].     Elissa reports pt has no future appts scheduled, pt was last seen 01/09/2024 & no fu was made. They rec 4-6wk fu from 1/9/24 visit.     I advised Elissa I will send updated referral with GDM.     Elissa scheduled pt for Friday 2/23/24 at 10:30am for GDM management & 11:00am for u/s with MFM at [facility].

## 2024-02-21 NOTE — TELEPHONE ENCOUNTER
Called pt to advise her I made her an [facility] appt for Friday & to arrive at 10:20am. Pt will see if she can attend that appt & call their office back if not. I advised pt she does not need to do both [facility] GDM management & [facility] GDM management- so pt will plan on sticking with [facility] for GDM management/education & serial growth ultrasounds. Pt reports [facility] office did not schedule pt for fu when she left [facility]'s office on 1/9/24.

## 2024-02-28 ENCOUNTER — ROUTINE PRENATAL (OUTPATIENT)
Age: 34
End: 2024-02-28

## 2024-02-28 VITALS — DIASTOLIC BLOOD PRESSURE: 73 MMHG | SYSTOLIC BLOOD PRESSURE: 108 MMHG | BODY MASS INDEX: 35.67 KG/M2 | WEIGHT: 221 LBS

## 2024-02-28 DIAGNOSIS — O09.529 SUPERVISION OF ELDERLY MULTIGRAVIDA, ANTEPARTUM: ICD-10-CM

## 2024-02-28 DIAGNOSIS — Z3A.32 32 WEEKS GESTATION OF PREGNANCY: Primary | ICD-10-CM

## 2024-02-28 PROCEDURE — 0502F SUBSEQUENT PRENATAL CARE: CPT | Performed by: OBSTETRICS & GYNECOLOGY

## 2024-02-28 NOTE — PROGRESS NOTES
[facility]  [email or web address]  [phone]  Stacey Fung MD, FACOG      Routine follow-up OB visit     Chief Complaint   Patient presents with    Routine Prenatal Visit       Patient Active Problem List    Diagnosis Date Noted    H/O:  2023    Multigravida of advanced maternal age in second trimester 2023    Supervision of multigravida of advanced maternal age, antepartum 2023    Prenatal care of multigravida, antepartum 2022       SUBJECTIVE:  Pt reports doing well, wearing support belt.     OBJECTIVE:  Vitals:    24 0827   BP: 108/73     See prenatal flowsheet    Physical Exam:  Gen: no acute distress, normal speech  Cardiac: appears warm and well perfused  Pulm: breathing comfortably on room air  Abd: soft, gravid, non-tender  Fundal height: see prenatal flowsheet  Ext: symmetric; moves all 4 extremities equally    Assessment:  33 y.o.  32w6d   Routine prenatal visit  Encounter Diagnoses   Name Primary?    32 weeks gestation of pregnancy Yes    Supervision of elderly multigravida, antepartum        Plan:   Routine follow up OB appointment  Continue routine prenatal care   Routine OB instructions given appropriate to gestational age, see AVS      No orders of the defined types were placed in this encounter.       No follow-ups on file.      On this date of service, I have spent greater than 10 minutes reviewing the patient's previous notes, test results and planned follow up as well as performing documentation related to this visit.  More than 50% of this time was spent face to face with the patient discussing the plan of care, diagnosis and importance of compliance with the prenatal care and physician recommendations as well as answering any questions.     [] SAB/bleeding precautions reviewed   [] PTL/PPROM precautions reviewed   [] Labor precautions reviewed   [] Fetal kick counts discussed   [] Labs reviewed with patient   [] ZIka

## 2024-03-05 ENCOUNTER — TELEMEDICINE (OUTPATIENT)
Age: 34
End: 2024-03-05
Payer: COMMERCIAL

## 2024-03-05 DIAGNOSIS — O24.419 GESTATIONAL DIABETES MELLITUS (GDM), ANTEPARTUM, GESTATIONAL DIABETES METHOD OF CONTROL UNSPECIFIED: Primary | ICD-10-CM

## 2024-03-05 PROCEDURE — G0108 DIAB MANAGE TRN  PER INDIV: HCPCS | Performed by: DIETITIAN, REGISTERED

## 2024-03-05 NOTE — PROGRESS NOTES
Electronically signed by Ashley Feliciano RD on 3/5/24 at 2:37 PM EST     I have personally reviewed the health record, including provider notes, laboratory data and current medications before making these care and education recommendations. The time spent in this effort is included in the total time.  Total minutes: 45

## 2024-03-19 NOTE — PATIENT INSTRUCTIONS
Dr. Fung's Patient Education Pages:    Preparing for Labor:    Preregistration:  [email or web address]  Maternity Pre-registration   [email or web address]      Contact the office/on-call service at [phone] if:   You have regular, uncomfortable contractions that are getting stronger and occur at least every five minutes, last at least one minute, and occur consistently for at least one hour (the 5-1-1 Rule).    Your water has broken (a big gush of fluid and/or persistent leakage of fluid, with or without regular contractions).  You are bleeding heavily from the vagina (but spotting after a cervical exam is not unusual).  You have constant, severe pain with no relief between contractions.  You notice your baby is moving less often or you have less than 10 movements or kicks over two hours.  You are not sure if you are in labor or you have another urgent concern.     Urgent after-hours calls: if we don't call you back within 30 minutes, please call again.  You can send non-urgent questions through eJ[employer] at any time.    For an emergency, call 9-1-1 or go to the hospital immediately.  Labor & Delivery is on the 2nd floor of [facility], to the left of the waterfall wall.     Other changes you may experience that can be observed until labor starts:  Javier Hadley contractions (or “practice” contractions) can be painful and can make you think you are in labor when you are not.  These are less regular than labor contractions and will usually improve with rest, a warm shower, and/or increased hydration.  You may feel the baby drop into the pelvis or “lightening.”   You may experience the loss of your mucus plug or an increase/change in vagina discharge.      To prepare for labor:  Pack a bag for the hospital and leave it near the door or in your car.  Make sure you have a properly installed car seat to bring home your baby.  Have a plan for who will care for your

## 2024-03-21 ENCOUNTER — ROUTINE PRENATAL (OUTPATIENT)
Age: 34
End: 2024-03-21

## 2024-03-21 VITALS
HEIGHT: 66 IN | BODY MASS INDEX: 35.9 KG/M2 | HEART RATE: 99 BPM | WEIGHT: 223.4 LBS | SYSTOLIC BLOOD PRESSURE: 132 MMHG | DIASTOLIC BLOOD PRESSURE: 84 MMHG

## 2024-03-21 DIAGNOSIS — Z34.80 PRENATAL CARE OF MULTIGRAVIDA, ANTEPARTUM: ICD-10-CM

## 2024-03-21 DIAGNOSIS — Z98.891 H/O: C-SECTION: Primary | ICD-10-CM

## 2024-03-21 DIAGNOSIS — Z3A.36 36 WEEKS GESTATION OF PREGNANCY: ICD-10-CM

## 2024-03-21 DIAGNOSIS — O09.529 SUPERVISION OF MULTIGRAVIDA OF ADVANCED MATERNAL AGE, ANTEPARTUM: ICD-10-CM

## 2024-03-21 NOTE — PROGRESS NOTES
[facility]  [email or web address]  [phone]  Stacey Fung MD, FACOG      Routine follow-up OB visit     Chief Complaint   Patient presents with    Routine Prenatal Visit       Patient Active Problem List    Diagnosis Date Noted    H/O:  2023    Multigravida of advanced maternal age in second trimester 2023    Supervision of multigravida of advanced maternal age, antepartum 2023    Prenatal care of multigravida, antepartum 2022       SUBJECTIVE:  Pt reports MFM did not address GDM at last visit.  Has fu Monday w MFM.  Seeing BS DTC.      OBJECTIVE:  Vitals:    24 0931   BP: 132/84   Pulse: 99     See prenatal flowsheet    Physical Exam:  Gen: no acute distress, normal speech  Cardiac: appears warm and well perfused  Pulm: breathing comfortably on room air  Abd: soft, gravid, non-tender  Fundal height: see prenatal flowsheet  Ext: symmetric; moves all 4 extremities equally    Assessment:  33 y.o.  36w0d   Routine prenatal visit  Encounter Diagnoses   Name Primary?    36 weeks gestation of pregnancy     Prenatal care of multigravida, antepartum     H/O:  Yes       Plan:   Routine follow up OB appointment  Continue routine prenatal care   Routine OB instructions given appropriate to gestational age, see AVS  GBS done  Disc importance of monitoring BS: will fu w MFM again re GDM management  Bring bs log to MFM fu: called and spoke w office.  Plan r cs      Orders Placed This Encounter   Procedures    Group B Strep by MICHELLE     Standing Status:   Future     Standing Expiration Date:   3/19/2025     Order Specific Question:   Sample Site ?     Answer:   VAGINAL/RECTAL        Return in about 1 week (around 3/28/2024) for TP FOB.      On this date of service, I have spent greater than 10 minutes reviewing the patient's previous notes, test results and planned follow up as well as performing documentation related to this visit.  More than

## 2024-03-24 LAB
GP B STREP DNA SPEC QL NAA+PROBE: NEGATIVE
SPECIMEN SOURCE: NORMAL

## 2024-03-28 ENCOUNTER — ROUTINE PRENATAL (OUTPATIENT)
Age: 34
End: 2024-03-28

## 2024-03-28 VITALS — DIASTOLIC BLOOD PRESSURE: 79 MMHG | BODY MASS INDEX: 36.48 KG/M2 | SYSTOLIC BLOOD PRESSURE: 122 MMHG | WEIGHT: 226 LBS

## 2024-03-28 DIAGNOSIS — Z3A.37 37 WEEKS GESTATION OF PREGNANCY: Primary | ICD-10-CM

## 2024-03-28 DIAGNOSIS — O09.529 SUPERVISION OF MULTIGRAVIDA OF ADVANCED MATERNAL AGE, ANTEPARTUM: ICD-10-CM

## 2024-03-28 PROCEDURE — 0502F SUBSEQUENT PRENATAL CARE: CPT | Performed by: OBSTETRICS & GYNECOLOGY

## 2024-03-28 NOTE — PROGRESS NOTES
[facility]  [email or web address]  [phone]  Stacey Fung MD, FACOG      Routine follow-up OB visit     Chief Complaint   Patient presents with    Routine Prenatal Visit       Patient Active Problem List    Diagnosis Date Noted    H/O:  2023    Multigravida of advanced maternal age in second trimester 2023    Supervision of multigravida of advanced maternal age, antepartum 2023    Prenatal care of multigravida, antepartum 2022       SUBJECTIVE:  Pt reports swelling in LE RLE> LLE but resolved  Good FM  Has cs prep     OBJECTIVE:  Vitals:    24 0952   BP: 122/79     See prenatal flowsheet    Physical Exam:  Gen: no acute distress, normal speech  Cardiac: appears warm and well perfused  Pulm: breathing comfortably on room air  Abd: soft, gravid, non-tender  Fundal height: see prenatal flowsheet  Ext: symmetric; moves all 4 extremities equally, trace edema b/l    Assessment:  33 y.o.  37w0d   Routine prenatal visit  Encounter Diagnoses   Name Primary?    37 weeks gestation of pregnancy Yes    Supervision of multigravida of advanced maternal age, antepartum        Plan:   Routine follow up OB appointment  Continue routine prenatal care   Routine OB instructions given appropriate to gestational age, see AVS  Disc use of cs prep  Dvt/pe precautions reviewed, plan observation    No orders of the defined types were placed in this encounter.       No follow-ups on file.      On this date of service, I have spent greater than 10 minutes reviewing the patient's previous notes, test results and planned follow up as well as performing documentation related to this visit.  More than 50% of this time was spent face to face with the patient discussing the plan of care, diagnosis and importance of compliance with the prenatal care and physician recommendations as well as answering any questions.     [] SAB/bleeding precautions reviewed   [] PTL/PPROM

## 2024-03-31 PROBLEM — O09.529 SUPERVISION OF MULTIGRAVIDA OF ADVANCED MATERNAL AGE, ANTEPARTUM: Chronic | Status: ACTIVE | Noted: 2023-09-08

## 2024-04-02 ENCOUNTER — TELEPHONE (OUTPATIENT)
Age: 34
End: 2024-04-02

## 2024-04-02 NOTE — TELEPHONE ENCOUNTER
----- Message from Stacey Fung MD sent at 3/31/2024 10:15 PM EDT -----  Regarding: mfm recs  Can you fu up latest mfm recs (pt should be seen this coming week)?  Pls email me if you see it scanned into media so I can review.    Do we need to move CS based on poor gdm control?    TRP

## 2024-04-02 NOTE — TELEPHONE ENCOUNTER
LVM with [facility] asking them to fax pt's most recent records following 3/25/24 visit to our office.

## 2024-04-04 ENCOUNTER — ROUTINE PRENATAL (OUTPATIENT)
Age: 34
End: 2024-04-04

## 2024-04-04 VITALS
WEIGHT: 226.6 LBS | BODY MASS INDEX: 36.57 KG/M2 | HEART RATE: 98 BPM | DIASTOLIC BLOOD PRESSURE: 82 MMHG | SYSTOLIC BLOOD PRESSURE: 127 MMHG

## 2024-04-04 DIAGNOSIS — Z3A.38 38 WEEKS GESTATION OF PREGNANCY: Primary | ICD-10-CM

## 2024-04-04 DIAGNOSIS — O09.529 SUPERVISION OF MULTIGRAVIDA OF ADVANCED MATERNAL AGE, ANTEPARTUM: Chronic | ICD-10-CM

## 2024-04-04 PROCEDURE — 0502F SUBSEQUENT PRENATAL CARE: CPT | Performed by: OBSTETRICS & GYNECOLOGY

## 2024-04-04 NOTE — PROGRESS NOTES
[facility]  [email or web address]  [phone]  Stacey Fung MD, FACOG      Routine follow-up OB visit     Chief Complaint   Patient presents with    Routine Prenatal Visit       Patient Active Problem List    Diagnosis Date Noted    H/O:  2023    Multigravida of advanced maternal age in second trimester 2023    Supervision of multigravida of advanced maternal age, antepartum 2023    Prenatal care of multigravida, antepartum 2022       SUBJECTIVE:  Pt reports doing well, good FM     OBJECTIVE:  Vitals:    24 0913   BP: 127/82   Pulse: 98     See prenatal flowsheet    Physical Exam:  Gen: no acute distress, normal speech  Cardiac: appears warm and well perfused  Pulm: breathing comfortably on room air  Abd: soft, gravid, non-tender  Fundal height: see prenatal flowsheet  Ext: symmetric; moves all 4 extremities equally    Assessment:  33 y.o.  38w0d   Routine prenatal visit  Encounter Diagnoses   Name Primary?    38 weeks gestation of pregnancy Yes    Supervision of multigravida of advanced maternal age, antepartum        Plan:   Routine follow up OB appointment  Continue routine prenatal care   Routine OB instructions given appropriate to gestational age, see AVS  CS next week  Disc FOB next week, optional, pt can contact us if wants to cancel  Has CS skin prep from previous visit    No orders of the defined types were placed in this encounter.       No follow-ups on file.      On this date of service, I have spent greater than 10 minutes reviewing the patient's previous notes, test results and planned follow up as well as performing documentation related to this visit.  More than 50% of this time was spent face to face with the patient discussing the plan of care, diagnosis and importance of compliance with the prenatal care and physician recommendations as well as answering any questions.     [] SAB/bleeding precautions reviewed   []

## 2024-04-05 ENCOUNTER — TELEPHONE (OUTPATIENT)
Age: 34
End: 2024-04-05

## 2024-04-05 NOTE — TELEPHONE ENCOUNTER
LVM for someone from their office to call us to update us on most recent report since we have not seen it in the fax yet.     Pt was seen 4/4/24, need to know delivery recommendation time frame.    Exam   Constitutional: She is oriented to person, place, and time. She appears well-developed and well-nourished. No distress. HENT:   Mouth/Throat: Oropharynx is clear and moist.   Eyes: Conjunctivae are normal.   Neck: Neck supple. Cardiovascular: Normal rate and regular rhythm. Murmur heard. Pulmonary/Chest: Effort normal and breath sounds normal.   Abdominal: Soft. Bowel sounds are normal. She exhibits no distension. There is no tenderness. Neurological: She is alert and oriented to person, place, and time. Skin: Skin is warm and dry. No rash noted. Psychiatric: Her speech is normal and behavior is normal. Judgment and thought content normal. Her mood appears not anxious. Cognition and memory are normal. She exhibits a depressed mood (mildly). She expresses no homicidal and no suicidal ideation. She expresses no suicidal plans and no homicidal plans. She is attentive. Nursing note and vitals reviewed. Data:     Lab Results   Component Value Date     03/06/2019    K 4.3 03/06/2019     03/06/2019    CO2 26 03/06/2019    BUN 17 03/06/2019    CREATININE 0.84 03/06/2019    GLUCOSE 93 03/06/2019    GLUCOSE 92 02/07/2012    PROT 7.0 01/08/2018    LABALBU 4.0 01/08/2018    BILITOT 0.27 01/08/2018    ALKPHOS 61 01/08/2018    AST 20 01/08/2018    ALT 16 01/08/2018     Lab Results   Component Value Date    WBC 6.8 07/22/2019    RBC 4.22 07/22/2019    RBC 4.31 02/07/2012    HGB 12.7 07/22/2019    HCT 40.0 07/22/2019    MCV 94.8 07/22/2019    MCH 30.1 07/22/2019    MCHC 31.8 07/22/2019    RDW 13.8 07/22/2019     07/22/2019     02/07/2012    MPV 9.5 07/22/2019     Lab Results   Component Value Date    TSH 5.77 03/06/2019     No results found for: CHOL, HDL, PSA, LABA1C    Assessment/Plan:      Diagnosis Orders   1. Congenital hypothyroidism  levothyroxine (SYNTHROID) 125 MCG tablet   2.  Anemia, unspecified type  Ferritin    Folate    CBC Auto Differential    Iron and TIBC Vitamin B12   3. Dysthymia  buPROPion (WELLBUTRIN XL) 150 MG extended release tablet     Given the fact that she has been fatigued and extremely tired during the day I feel her levothyroxine should be increased to 125 mcg daily. We will obtain iron studies due to mildly decreased hemoglobin. We will also start Wellbutrin 150 mg daily to help with mood. She will return in 1 month for follow-up. 1.  Serene received counseling on the following healthy behaviors: nutrition, exercise and medication adherence  2. Patient given educational materials - see patient instructions  3. Was a self-tracking handout given in paper form or via [employer]? No  If yes, see orders or list here. 4.  Discussed use, benefit, and side effects of prescribed medications. Barriers to medication compliance addressed. All patient questions answered. Pt voiced understanding. 5.  Reviewed prior labs and health maintenance  6. Continue current medications, diet and exercise. Completed Refills   Requested Prescriptions     Signed Prescriptions Disp Refills    levothyroxine (SYNTHROID) 125 MCG tablet 90 tablet 1     Sig: Take 1 tablet by mouth Daily    buPROPion (WELLBUTRIN XL) 150 MG extended release tablet 90 tablet 1     Sig: Take 1 tablet by mouth every morning         Return in about 4 weeks (around 8/19/2019) for check up.

## 2024-04-09 NOTE — TELEPHONE ENCOUNTER
Calling to advise I do not see pt's report in the fax yet from her 4/4/24 visit, wondering if someone can review ultrasound recommendations with me so I have an idea if they rec earlier delivery prior to receiving the results           Transferred to Shannan, she reports it doesn't look like Dr. Burrell had closed out the report yet, she placed me on hold to go discuss with Dr. Burrell.       Dr. Burrell was seeing a patient & Shannan would call back.

## 2024-04-09 NOTE — TELEPHONE ENCOUNTER
Yamila Desir LPN         4/9/24 10:15 AM  Note     Shannan from [facility] returned phone call and stated Dr. Crawford recommendation is to deliver between now and the next to weeks.

## 2024-04-11 ENCOUNTER — ANESTHESIA EVENT (OUTPATIENT)
Facility: HOSPITAL | Age: 34
End: 2024-04-11
Payer: COMMERCIAL

## 2024-04-11 NOTE — ANESTHESIA PRE PROCEDURE
Department of Anesthesiology  Preprocedure Note       Name:  Anabel Starr   Age:  33 y.o.  :  1990                                          MRN:  [id number]         Date:  2024      Surgeon: Surgeon(s):  Stacey Fung MD    Procedure: Procedure(s):   SECTION    Medications prior to admission:   Prior to Admission medications    Medication Sig Start Date End Date Taking? Authorizing Provider   Doxylamine-Pyridoxine ER (BONJESTA) 20-20 MG TBCR Take 1 tablet by mouth in the morning and 1 tablet in the evening. Start with one tablet po qd and increase to q tab po q12 hrs prn n/v.. 23   Stacey Fung MD   folic acid (FOLVITE) 1 MG tablet Take 1 tablet by mouth daily    ProviderVioletta MD   Prenatal Vit-Fe Fumarate-FA (PRENATAL 1+1 PO) Take by mouth    Provider, MD Violetta       Current medications:    No current facility-administered medications for this encounter.     Current Outpatient Medications   Medication Sig Dispense Refill   • Doxylamine-Pyridoxine ER (BONJESTA) 20-20 MG TBCR Take 1 tablet by mouth in the morning and 1 tablet in the evening. Start with one tablet po qd and increase to q tab po q12 hrs prn n/v.. 60 tablet 3   • folic acid (FOLVITE) 1 MG tablet Take 1 tablet by mouth daily     • Prenatal Vit-Fe Fumarate-FA (PRENATAL 1+1 PO) Take by mouth         Allergies:    Allergies   Allergen Reactions   • Codeine Anaphylaxis, Hives and Shortness Of Breath     Tolerates oxycodone    • Honey Hives and Itching       Problem List:    Patient Active Problem List   Diagnosis Code   • Prenatal care of multigravida, antepartum Z34.80   • Supervision of multigravida of advanced maternal age, antepartum O09.529   • H/O:  Z98.891   • Multigravida of advanced maternal age in second trimester O09.522       Past Medical History:        Diagnosis Date   • Gestational diabetes 2024   • Pap smear for cervical cancer screening 2023    normal; HPV not tested

## 2024-04-11 NOTE — H&P
[facility]  [facility]  [email or web address]  [phone]    Stacey Fung MD, FACOG     Labor and Delivery History & Physical  Template updated 2023    Name: Anabel Starr MRN: [id number]  SSN: [id number]    YOB: 1990  Age: 33 y.o.  Sex: female        Subjective:     Estimated Date of Delivery: 24  OB History    Para Term  AB Living   3 1   1 1 1   SAB IAB Ectopic Molar Multiple Live Births             1      # Outcome Date GA Lbr Christofer/2nd Weight Sex Delivery Anes PTL Lv   3 Current            2  22 36w6d  2.92 kg (6 lb 7 oz) F CS-Unspec   OLIVER      Birth Comments: sent to NICU for 1 night for oxygen, then released   1 AB               Obstetric Comments   Scheduled CS, Placenta previa, GDM, insulin         Ms. Starr is arrived to L and D from home with a pregnancy at 39w1d for  Section.   Prenatal course was complicated by  GDM  previous  section.    Please see prenatal records for details.   Patient Active Problem List    Diagnosis Date Noted    History of  delivery 2024    H/O:  2023    Multigravida of advanced maternal age in second trimester 2023    Supervision of multigravida of advanced maternal age, antepartum 2023    Prenatal care of multigravida, antepartum 2022         Patient reports good FM, occ UC     Patient denies no HA/CP SOB/vision changes, LOF, vaginal bleeding    Past Medical History:   Diagnosis Date    Gestational diabetes 2024    Pap smear for cervical cancer screening 2023    normal; HPV not tested     History reviewed. No pertinent surgical history.  Social History     Socioeconomic History    Marital status:      Spouse name: None    Number of children: None    Years of education: None    Highest education level: None   Tobacco Use    Smoking status: Never    Smokeless tobacco: Never   Vaping Use    Vaping

## 2024-04-12 ENCOUNTER — ANESTHESIA (OUTPATIENT)
Facility: HOSPITAL | Age: 34
End: 2024-04-12
Payer: COMMERCIAL

## 2024-04-12 ENCOUNTER — HOSPITAL ENCOUNTER (INPATIENT)
Facility: HOSPITAL | Age: 34
LOS: 2 days | Discharge: HOME | End: 2024-04-14
Attending: OBSTETRICS & GYNECOLOGY | Admitting: OBSTETRICS & GYNECOLOGY
Payer: COMMERCIAL

## 2024-04-12 DIAGNOSIS — Z98.891 HISTORY OF CESAREAN DELIVERY: Primary | ICD-10-CM

## 2024-04-12 LAB
ALBUMIN SERPL-MCNC: 2.7 G/DL (ref 3.5–5)
ALBUMIN/GLOB SERPL: 0.8 (ref 1.1–2.2)
ALP SERPL-CCNC: 246 U/L (ref 45–117)
ALT SERPL-CCNC: 17 U/L (ref 12–78)
ANION GAP SERPL CALC-SCNC: 8 MMOL/L (ref 5–15)
AST SERPL-CCNC: 21 U/L (ref 15–37)
BILIRUB SERPL-MCNC: 0.7 MG/DL (ref 0.2–1)
BUN SERPL-MCNC: 11 MG/DL (ref 6–20)
BUN/CREAT SERPL: 11 (ref 12–20)
CALCIUM SERPL-MCNC: 8.9 MG/DL (ref 8.5–10.1)
CHLORIDE SERPL-SCNC: 107 MMOL/L (ref 97–108)
CO2 SERPL-SCNC: 21 MMOL/L (ref 21–32)
CREAT SERPL-MCNC: 0.98 MG/DL (ref 0.55–1.02)
ERYTHROCYTE [DISTWIDTH] IN BLOOD BY AUTOMATED COUNT: 15.5 % (ref 11.5–14.5)
GLOBULIN SER CALC-MCNC: 3.3 G/DL (ref 2–4)
GLUCOSE SERPL-MCNC: 90 MG/DL (ref 65–100)
HCT VFR BLD AUTO: 32.1 % (ref 35–47)
HGB BLD-MCNC: 10.7 G/DL (ref 11.5–16)
MCH RBC QN AUTO: 25.8 PG (ref 26–34)
MCHC RBC AUTO-ENTMCNC: 33.3 G/DL (ref 30–36.5)
MCV RBC AUTO: 77.3 FL (ref 80–99)
NRBC # BLD: 0.03 K/UL (ref 0–0.01)
NRBC BLD-RTO: 0.3 PER 100 WBC
PLATELET # BLD AUTO: 219 K/UL (ref 150–400)
PMV BLD AUTO: 12.2 FL (ref 8.9–12.9)
POTASSIUM SERPL-SCNC: 3.9 MMOL/L (ref 3.5–5.1)
PROT SERPL-MCNC: 6 G/DL (ref 6.4–8.2)
RBC # BLD AUTO: 4.15 M/UL (ref 3.8–5.2)
SODIUM SERPL-SCNC: 136 MMOL/L (ref 136–145)
WBC # BLD AUTO: 9.4 K/UL (ref 3.6–11)

## 2024-04-12 PROCEDURE — 36415 COLL VENOUS BLD VENIPUNCTURE: CPT

## 2024-04-12 PROCEDURE — 6370000000 HC RX 637 (ALT 250 FOR IP): Performed by: OBSTETRICS & GYNECOLOGY

## 2024-04-12 PROCEDURE — 2580000003 HC RX 258: Performed by: OBSTETRICS & GYNECOLOGY

## 2024-04-12 PROCEDURE — 86644 CMV ANTIBODY: CPT

## 2024-04-12 PROCEDURE — 3609079900 HC CESAREAN SECTION: Performed by: OBSTETRICS & GYNECOLOGY

## 2024-04-12 PROCEDURE — 6370000000 HC RX 637 (ALT 250 FOR IP): Performed by: ANESTHESIOLOGY

## 2024-04-12 PROCEDURE — 6360000002 HC RX W HCPCS: Performed by: OBSTETRICS & GYNECOLOGY

## 2024-04-12 PROCEDURE — 7100000000 HC PACU RECOVERY - FIRST 15 MIN: Performed by: OBSTETRICS & GYNECOLOGY

## 2024-04-12 PROCEDURE — 86921 COMPATIBILITY TEST INCUBATE: CPT

## 2024-04-12 PROCEDURE — 6360000002 HC RX W HCPCS: Performed by: NURSE ANESTHETIST, CERTIFIED REGISTERED

## 2024-04-12 PROCEDURE — 86850 RBC ANTIBODY SCREEN: CPT

## 2024-04-12 PROCEDURE — 86922 COMPATIBILITY TEST ANTIGLOB: CPT

## 2024-04-12 PROCEDURE — 86870 RBC ANTIBODY IDENTIFICATION: CPT

## 2024-04-12 PROCEDURE — 7100000001 HC PACU RECOVERY - ADDTL 15 MIN: Performed by: OBSTETRICS & GYNECOLOGY

## 2024-04-12 PROCEDURE — 86780 TREPONEMA PALLIDUM: CPT

## 2024-04-12 PROCEDURE — 87340 HEPATITIS B SURFACE AG IA: CPT

## 2024-04-12 PROCEDURE — 1120000000 HC RM PRIVATE OB

## 2024-04-12 PROCEDURE — 59510 CESAREAN DELIVERY: CPT | Performed by: OBSTETRICS & GYNECOLOGY

## 2024-04-12 PROCEDURE — 86901 BLOOD TYPING SEROLOGIC RH(D): CPT

## 2024-04-12 PROCEDURE — 2709999900 HC NON-CHARGEABLE SUPPLY: Performed by: OBSTETRICS & GYNECOLOGY

## 2024-04-12 PROCEDURE — 2500000003 HC RX 250 WO HCPCS: Performed by: NURSE ANESTHETIST, CERTIFIED REGISTERED

## 2024-04-12 PROCEDURE — 6360000002 HC RX W HCPCS: Performed by: ANESTHESIOLOGY

## 2024-04-12 PROCEDURE — 3700000000 HC ANESTHESIA ATTENDED CARE: Performed by: OBSTETRICS & GYNECOLOGY

## 2024-04-12 PROCEDURE — 3700000001 HC ADD 15 MINUTES (ANESTHESIA): Performed by: OBSTETRICS & GYNECOLOGY

## 2024-04-12 PROCEDURE — 94761 N-INVAS EAR/PLS OXIMETRY MLT: CPT

## 2024-04-12 PROCEDURE — 80053 COMPREHEN METABOLIC PANEL: CPT

## 2024-04-12 PROCEDURE — 86900 BLOOD TYPING SEROLOGIC ABO: CPT

## 2024-04-12 PROCEDURE — 86920 COMPATIBILITY TEST SPIN: CPT

## 2024-04-12 PROCEDURE — 85027 COMPLETE CBC AUTOMATED: CPT

## 2024-04-12 RX ORDER — PHENYLEPHRINE HCL IN 0.9% NACL 0.4MG/10ML
SYRINGE (ML) INTRAVENOUS PRN
Status: DISCONTINUED | OUTPATIENT
Start: 2024-04-12 | End: 2024-04-12 | Stop reason: SDUPTHER

## 2024-04-12 RX ORDER — OXYCODONE HYDROCHLORIDE AND ACETAMINOPHEN 5; 325 MG/1; MG/1
2 TABLET ORAL EVERY 4 HOURS PRN
Status: DISCONTINUED | OUTPATIENT
Start: 2024-04-13 | End: 2024-04-14 | Stop reason: HOSPADM

## 2024-04-12 RX ORDER — SODIUM CHLORIDE 0.9 % (FLUSH) 0.9 %
5-40 SYRINGE (ML) INJECTION EVERY 12 HOURS SCHEDULED
Status: DISCONTINUED | OUTPATIENT
Start: 2024-04-12 | End: 2024-04-13 | Stop reason: ALTCHOICE

## 2024-04-12 RX ORDER — NALOXONE HYDROCHLORIDE 0.4 MG/ML
INJECTION, SOLUTION INTRAMUSCULAR; INTRAVENOUS; SUBCUTANEOUS PRN
Status: ACTIVE | OUTPATIENT
Start: 2024-04-12 | End: 2024-04-13

## 2024-04-12 RX ORDER — SODIUM CHLORIDE, SODIUM LACTATE, POTASSIUM CHLORIDE, CALCIUM CHLORIDE 600; 310; 30; 20 MG/100ML; MG/100ML; MG/100ML; MG/100ML
INJECTION, SOLUTION INTRAVENOUS CONTINUOUS
Status: DISCONTINUED | OUTPATIENT
Start: 2024-04-12 | End: 2024-04-12 | Stop reason: SDUPTHER

## 2024-04-12 RX ORDER — ONDANSETRON 4 MG/1
4 TABLET, ORALLY DISINTEGRATING ORAL EVERY 8 HOURS PRN
Status: DISCONTINUED | OUTPATIENT
Start: 2024-04-12 | End: 2024-04-14 | Stop reason: HOSPADM

## 2024-04-12 RX ORDER — ONDANSETRON 2 MG/ML
INJECTION INTRAMUSCULAR; INTRAVENOUS PRN
Status: DISCONTINUED | OUTPATIENT
Start: 2024-04-12 | End: 2024-04-12 | Stop reason: SDUPTHER

## 2024-04-12 RX ORDER — BUPIVACAINE HYDROCHLORIDE 7.5 MG/ML
INJECTION, SOLUTION INTRASPINAL
Status: COMPLETED | OUTPATIENT
Start: 2024-04-12 | End: 2024-04-12

## 2024-04-12 RX ORDER — FAMOTIDINE 10 MG/ML
INJECTION, SOLUTION INTRAVENOUS PRN
Status: DISCONTINUED | OUTPATIENT
Start: 2024-04-12 | End: 2024-04-12 | Stop reason: SDUPTHER

## 2024-04-12 RX ORDER — OXYCODONE HYDROCHLORIDE AND ACETAMINOPHEN 5; 325 MG/1; MG/1
1 TABLET ORAL EVERY 4 HOURS PRN
Status: DISCONTINUED | OUTPATIENT
Start: 2024-04-13 | End: 2024-04-14 | Stop reason: HOSPADM

## 2024-04-12 RX ORDER — SODIUM CHLORIDE, SODIUM LACTATE, POTASSIUM CHLORIDE, AND CALCIUM CHLORIDE .6; .31; .03; .02 G/100ML; G/100ML; G/100ML; G/100ML
1000 INJECTION, SOLUTION INTRAVENOUS ONCE
Status: COMPLETED | OUTPATIENT
Start: 2024-04-12 | End: 2024-04-12

## 2024-04-12 RX ORDER — MISOPROSTOL 200 UG/1
800 TABLET ORAL PRN
Status: DISCONTINUED | OUTPATIENT
Start: 2024-04-12 | End: 2024-04-14 | Stop reason: HOSPADM

## 2024-04-12 RX ORDER — SODIUM CHLORIDE 9 MG/ML
INJECTION, SOLUTION INTRAVENOUS PRN
Status: DISCONTINUED | OUTPATIENT
Start: 2024-04-12 | End: 2024-04-13 | Stop reason: ALTCHOICE

## 2024-04-12 RX ORDER — ACETAMINOPHEN 500 MG
1000 TABLET ORAL EVERY 8 HOURS SCHEDULED
Status: DISCONTINUED | OUTPATIENT
Start: 2024-04-12 | End: 2024-04-14 | Stop reason: HOSPADM

## 2024-04-12 RX ORDER — PENICILLIN G POTASSIUM 5000000 [IU]/1
INJECTION, POWDER, FOR SOLUTION INTRAMUSCULAR; INTRAVENOUS
Status: DISCONTINUED
Start: 2024-04-12 | End: 2024-04-12 | Stop reason: ALTCHOICE

## 2024-04-12 RX ORDER — LANOLIN/MINERAL OIL
LOTION (ML) TOPICAL
Status: DISCONTINUED | OUTPATIENT
Start: 2024-04-12 | End: 2024-04-14 | Stop reason: HOSPADM

## 2024-04-12 RX ORDER — OXYCODONE HYDROCHLORIDE 5 MG/1
5 TABLET ORAL EVERY 4 HOURS PRN
Status: ACTIVE | OUTPATIENT
Start: 2024-04-12 | End: 2024-04-13

## 2024-04-12 RX ORDER — ONDANSETRON 2 MG/ML
4 INJECTION INTRAMUSCULAR; INTRAVENOUS EVERY 6 HOURS PRN
Status: DISCONTINUED | OUTPATIENT
Start: 2024-04-12 | End: 2024-04-14 | Stop reason: HOSPADM

## 2024-04-12 RX ORDER — SODIUM CHLORIDE 0.9 % (FLUSH) 0.9 %
10 SYRINGE (ML) INJECTION PRN
Status: DISCONTINUED | OUTPATIENT
Start: 2024-04-12 | End: 2024-04-12 | Stop reason: ALTCHOICE

## 2024-04-12 RX ORDER — EPHEDRINE SULFATE/0.9% NACL/PF 50 MG/5 ML
SYRINGE (ML) INTRAVENOUS PRN
Status: DISCONTINUED | OUTPATIENT
Start: 2024-04-12 | End: 2024-04-12 | Stop reason: SDUPTHER

## 2024-04-12 RX ORDER — OXYCODONE HYDROCHLORIDE 5 MG/1
10 TABLET ORAL EVERY 4 HOURS PRN
Status: DISPENSED | OUTPATIENT
Start: 2024-04-12 | End: 2024-04-13

## 2024-04-12 RX ORDER — CITRIC ACID/SODIUM CITRATE 334-500MG
30 SOLUTION, ORAL ORAL ONCE
Status: DISCONTINUED | OUTPATIENT
Start: 2024-04-12 | End: 2024-04-12 | Stop reason: ALTCHOICE

## 2024-04-12 RX ORDER — KETOROLAC TROMETHAMINE 30 MG/ML
30 INJECTION, SOLUTION INTRAMUSCULAR; INTRAVENOUS EVERY 6 HOURS
Status: DISCONTINUED | OUTPATIENT
Start: 2024-04-12 | End: 2024-04-12 | Stop reason: SDUPTHER

## 2024-04-12 RX ORDER — SODIUM CHLORIDE 9 MG/ML
INJECTION, SOLUTION INTRAVENOUS PRN
Status: DISCONTINUED | OUTPATIENT
Start: 2024-04-12 | End: 2024-04-12 | Stop reason: SDUPTHER

## 2024-04-12 RX ORDER — SODIUM CHLORIDE 0.9 % (FLUSH) 0.9 %
5-40 SYRINGE (ML) INJECTION PRN
Status: DISCONTINUED | OUTPATIENT
Start: 2024-04-12 | End: 2024-04-14 | Stop reason: HOSPADM

## 2024-04-12 RX ORDER — KETOROLAC TROMETHAMINE 30 MG/ML
30 INJECTION, SOLUTION INTRAMUSCULAR; INTRAVENOUS EVERY 6 HOURS
Status: DISPENSED | OUTPATIENT
Start: 2024-04-12 | End: 2024-04-13

## 2024-04-12 RX ORDER — IBUPROFEN 800 MG/1
800 TABLET ORAL EVERY 8 HOURS
Status: DISCONTINUED | OUTPATIENT
Start: 2024-04-13 | End: 2024-04-14 | Stop reason: HOSPADM

## 2024-04-12 RX ORDER — OXYTOCIN 10 [USP'U]/ML
INJECTION, SOLUTION INTRAMUSCULAR; INTRAVENOUS PRN
Status: DISCONTINUED | OUTPATIENT
Start: 2024-04-12 | End: 2024-04-12 | Stop reason: SDUPTHER

## 2024-04-12 RX ORDER — KETOROLAC TROMETHAMINE 30 MG/ML
INJECTION, SOLUTION INTRAMUSCULAR; INTRAVENOUS PRN
Status: DISCONTINUED | OUTPATIENT
Start: 2024-04-12 | End: 2024-04-12 | Stop reason: SDUPTHER

## 2024-04-12 RX ORDER — SODIUM CHLORIDE, SODIUM LACTATE, POTASSIUM CHLORIDE, CALCIUM CHLORIDE 600; 310; 30; 20 MG/100ML; MG/100ML; MG/100ML; MG/100ML
INJECTION, SOLUTION INTRAVENOUS CONTINUOUS
Status: DISCONTINUED | OUTPATIENT
Start: 2024-04-12 | End: 2024-04-13 | Stop reason: ALTCHOICE

## 2024-04-12 RX ORDER — BISACODYL 10 MG
10 SUPPOSITORY, RECTAL RECTAL DAILY PRN
Status: DISCONTINUED | OUTPATIENT
Start: 2024-04-12 | End: 2024-04-14 | Stop reason: HOSPADM

## 2024-04-12 RX ORDER — DOCUSATE SODIUM 100 MG/1
100 CAPSULE, LIQUID FILLED ORAL 2 TIMES DAILY
Status: DISCONTINUED | OUTPATIENT
Start: 2024-04-12 | End: 2024-04-14 | Stop reason: HOSPADM

## 2024-04-12 RX ORDER — MORPHINE SULFATE 1 MG/ML
INJECTION, SOLUTION EPIDURAL; INTRATHECAL; INTRAVENOUS
Status: COMPLETED | OUTPATIENT
Start: 2024-04-12 | End: 2024-04-12

## 2024-04-12 RX ORDER — FAMOTIDINE 20 MG/1
20 TABLET, FILM COATED ORAL 2 TIMES DAILY PRN
Status: DISCONTINUED | OUTPATIENT
Start: 2024-04-12 | End: 2024-04-14 | Stop reason: HOSPADM

## 2024-04-12 RX ORDER — ONDANSETRON 2 MG/ML
4 INJECTION INTRAMUSCULAR; INTRAVENOUS EVERY 6 HOURS PRN
Status: DISCONTINUED | OUTPATIENT
Start: 2024-04-12 | End: 2024-04-12 | Stop reason: SDUPTHER

## 2024-04-12 RX ORDER — SODIUM CHLORIDE 0.9 % (FLUSH) 0.9 %
10 SYRINGE (ML) INJECTION EVERY 12 HOURS SCHEDULED
Status: DISCONTINUED | OUTPATIENT
Start: 2024-04-12 | End: 2024-04-12 | Stop reason: ALTCHOICE

## 2024-04-12 RX ADMIN — MORPHINE SULFATE 0.2 MG: 1 INJECTION, SOLUTION EPIDURAL; INTRATHECAL; INTRAVENOUS at 07:35

## 2024-04-12 RX ADMIN — DOCUSATE SODIUM 100 MG: 100 CAPSULE, LIQUID FILLED ORAL at 20:16

## 2024-04-12 RX ADMIN — KETOROLAC TROMETHAMINE 30 MG: 30 INJECTION, SOLUTION INTRAMUSCULAR; INTRAVENOUS at 08:38

## 2024-04-12 RX ADMIN — Medication 80 MCG: at 07:52

## 2024-04-12 RX ADMIN — Medication 40 MCG: at 08:18

## 2024-04-12 RX ADMIN — Medication 10 MG: at 08:37

## 2024-04-12 RX ADMIN — OXYCODONE 10 MG: 5 TABLET ORAL at 20:17

## 2024-04-12 RX ADMIN — Medication 5 MG: at 08:03

## 2024-04-12 RX ADMIN — Medication 80 MCG: at 08:06

## 2024-04-12 RX ADMIN — Medication 10 MG: at 08:27

## 2024-04-12 RX ADMIN — OXYTOCIN 30 UNITS: 10 INJECTION, SOLUTION INTRAMUSCULAR; INTRAVENOUS at 08:16

## 2024-04-12 RX ADMIN — FAMOTIDINE 20 MG: 10 INJECTION INTRAVENOUS at 07:45

## 2024-04-12 RX ADMIN — KETOROLAC TROMETHAMINE 30 MG: 30 INJECTION INTRAMUSCULAR; INTRAVENOUS at 20:17

## 2024-04-12 RX ADMIN — ACETAMINOPHEN 1000 MG: 500 TABLET ORAL at 10:15

## 2024-04-12 RX ADMIN — SODIUM CHLORIDE, POTASSIUM CHLORIDE, SODIUM LACTATE AND CALCIUM CHLORIDE: 600; 310; 30; 20 INJECTION, SOLUTION INTRAVENOUS at 11:03

## 2024-04-12 RX ADMIN — Medication 80 MCG: at 08:38

## 2024-04-12 RX ADMIN — OXYCODONE 10 MG: 5 TABLET ORAL at 15:54

## 2024-04-12 RX ADMIN — Medication 80 MCG: at 07:58

## 2024-04-12 RX ADMIN — BUPIVACAINE HYDROCHLORIDE IN DEXTROSE 12 MG: 7.5 INJECTION, SOLUTION SUBARACHNOID at 07:35

## 2024-04-12 RX ADMIN — Medication 40 MCG: at 08:28

## 2024-04-12 RX ADMIN — KETOROLAC TROMETHAMINE 30 MG: 30 INJECTION INTRAMUSCULAR; INTRAVENOUS at 14:38

## 2024-04-12 RX ADMIN — DOCUSATE SODIUM 100 MG: 100 CAPSULE, LIQUID FILLED ORAL at 15:54

## 2024-04-12 RX ADMIN — SODIUM CHLORIDE, POTASSIUM CHLORIDE, SODIUM LACTATE AND CALCIUM CHLORIDE 1000 ML: 600; 310; 30; 20 INJECTION, SOLUTION INTRAVENOUS at 05:49

## 2024-04-12 RX ADMIN — ONDANSETRON HYDROCHLORIDE 4 MG: 2 SOLUTION INTRAMUSCULAR; INTRAVENOUS at 07:44

## 2024-04-12 ASSESSMENT — PAIN SCALES - GENERAL
PAINLEVEL_OUTOF10: 6
PAINLEVEL_OUTOF10: 3
PAINLEVEL_OUTOF10: 6
PAINLEVEL_OUTOF10: 7

## 2024-04-12 ASSESSMENT — PAIN - FUNCTIONAL ASSESSMENT
PAIN_FUNCTIONAL_ASSESSMENT: ACTIVITIES ARE NOT PREVENTED

## 2024-04-12 ASSESSMENT — PAIN DESCRIPTION - LOCATION
LOCATION: ABDOMEN;INCISION
LOCATION: ABDOMEN
LOCATION: ABDOMEN
LOCATION: ABDOMEN;INCISION

## 2024-04-12 ASSESSMENT — PAIN DESCRIPTION - ORIENTATION
ORIENTATION: LOWER
ORIENTATION: LOWER

## 2024-04-12 ASSESSMENT — PAIN DESCRIPTION - DESCRIPTORS
DESCRIPTORS: DISCOMFORT
DESCRIPTORS: SORE;SHARP
DESCRIPTORS: SORE;TENDER
DESCRIPTORS: SORE;TENDER;DISCOMFORT

## 2024-04-12 NOTE — PROGRESS NOTES
0530- This RN assuming care of pt     0720- Pt abdomen wiped down with Chg wipes.    0728- MD Fung aware pt has had systolic blood pressures ranging from 130'2-140'2. MD aware this RN sent Cmp lab test. MD reviewing labs      0730- Pt in OR for scheduled , please see  summary for further details.    0840- removal of scar upon surgical incision by MD Fung.     0903- Pt back in room after scheduled . Pt alert and stable. This RN at bedside     1015- Pt tolerating sips of water     1140- TRANSFER - OUT REPORT:    Verbal report given to Socorro Rock RN on Anabel Starr  being transferred to MIU for routine progression of patient care       Report consisted of patient's Situation, Background, Assessment and   Recommendations(SBAR).     Information from the following report(s) Nurse Handoff Report, Adult Overview, Surgery Report, Intake/Output, MAR, and Recent Results was reviewed with the receiving nurse.           Lines:   Peripheral IV 24 Left;Posterior Hand (Active)   Site Assessment Clean, dry & intact 24 0605   Line Status Blood return noted;Brisk blood return;Flushed 24 0535   Line Care Connections checked and tightened 24 0535   Phlebitis Assessment No symptoms 24 0605   Infiltration Assessment 0 24 0605   Alcohol Cap Used Yes 24 0535   Dressing Status Clean, dry & intact 24 0605   Dressing Type Transparent 24 0535   Dressing Intervention New 24 0535        Opportunity for questions and clarification was provided.      Patient transported with:  Registered Nurse

## 2024-04-12 NOTE — ANESTHESIA PROCEDURE NOTES
Spinal Block    End time: 4/12/2024 7:45 AM  Reason for block: primary anesthetic  Staffing  Performed: resident/CRNA   Anesthesiologist: Aren Flores MD  Resident/CRNA: Melissa Barrett APRN - CRNA  Performed by: Melissa Barrett APRN - CRNA  Authorized by: Aren Flores MD    Spinal Block  Patient position: sitting  Prep: DuraPrep  Patient monitoring: continuous pulse ox, continuous capnometry, frequent blood pressure checks and oxygen  Approach: midline  Location: L4/L5  Provider prep: mask  Needle  Needle type: Pencan   Needle gauge: 25 G  Needle length: 3.5 in  Assessment  Sensory level: T4  Swirl obtained: Yes  CSF: clear  Attempts: 2  Hemodynamics: stable  Preanesthetic Checklist  Completed: patient identified, IV checked, site marked, risks and benefits discussed, surgical/procedural consents, equipment checked, pre-op evaluation, timeout performed, anesthesia consent given, oxygen available, monitors applied/VS acknowledged, fire risk safety assessment completed and verbalized and blood product R/B/A discussed and consented           Saucerization Depth: dermis and superficial adipose tissue

## 2024-04-12 NOTE — BRIEF OP NOTE
Brief Postoperative Note      Patient: Anabel Starr  YOB: 1990  MRN: [id number]    Date of Procedure: 2024    Pre-Op Diagnosis Codes:     * Previous  delivery affecting pregnancy [O34.219]    Post-Op Diagnosis: Same       Procedure(s):   SECTION  LTCS, revision of skin scar 15 cm  Surgeon(s):  Stacey Fung MD    Assistant:  Surgical Assistant: Topher Baires    Anesthesia: Spinal    Estimated Blood Loss (mL): 800 cc    Complications: None, body cord x1    Specimens:   * No specimens in log *    Implants:  * No implants in log *      Drains:   Urinary Catheter 24 Muller (Active)   $ Urethral catheter insertion Inserted for procedure 24   Catheter Indications Perioperative use for selected surgical procedures 24   Site Assessment Urethral drainage 24   Urine Appearance Clear 24   Collection Container Standard 24   Securement Method Securing device (Describe) 24 07   Catheter Best Practices  Drainage tube clipped to bed;Drainage bag less than half full;Catheter secured to thigh;Tamper seal intact;Bag below bladder;Bag not on floor;Lack of dependent loop in tubing 24       Findings:  Infection Present At Time Of Surgery (PATOS) (choose all levels that have infection present):  No infection present  Other Findings: thickened scar from previous CS    Electronically signed by Stacey Fung MD on 2024 at 7:38 AM

## 2024-04-12 NOTE — ANESTHESIA POSTPROCEDURE EVALUATION
Department of Anesthesiology  Postprocedure Note    Patient: Anabel Starr  MRN: [id number]  YOB: 1990  Date of evaluation: 2024    Procedure Summary       Date: 24 Room / Location: [facility] L&D 02 / [facility] L&D OR    Anesthesia Start: 730 Anesthesia Stop: 901    Procedure:  SECTION (Abdomen) Diagnosis:       Previous  delivery affecting pregnancy      (Previous  delivery affecting pregnancy [O34.219])    Surgeons: Stacey Fung MD Responsible Provider: Aren Flores MD    Anesthesia Type: spinal ASA Status: 2            Anesthesia Type: No value filed.    Neo Phase I:      Neo Phase II:      Anesthesia Post Evaluation    Patient location during evaluation: PACU  Patient participation: complete - patient participated  Level of consciousness: awake  Pain score: 0  Airway patency: patent  Nausea & Vomiting: no nausea and no vomiting  Cardiovascular status: blood pressure returned to baseline  Respiratory status: acceptable  Hydration status: euvolemic  Multimodal analgesia pain management approach  Pain management: adequate    No notable events documented.

## 2024-04-12 NOTE — OP NOTE
[facility]  [email or web address]  [phone]    Stacey Fung MD, FACOG       Pre-operative Diagnosis: Previous  delivery affecting pregnancy [O34.219]    Post-operative Diagnosis: same, body cord x1 reducible    Procedure: Low transverse  section, repeat.  Revision of skin scar 15 cm    Surgeon: Stacey Fung MD    Assistant: labor and delivery staff, Surgical Assistant: Topher Baires  Scrub Person First: Candis Bundy    EBL: 800cc    Urine output: per anesthesia records    Anesthesia: Spinal    Prophylactic Antibiotics: Ancef    DVT Prophylaxis: Sequential Compression Devices    Complications: none    Implants: none      Procedure Detail:      After proper patient identification and consent, the patient was taken to the operating room, where spinal anesthesia was administered and found to be adequate. A gruber catheter had been previously placed using sterile technique.  The patient was prepped and draped in the normal sterile fashion for abdominopelvic surgery.      The raised skin incision was removed with an elliptical skin incision.  It measured 15 cm.   A Pfannenstiel skin incision was made with a scalpel and carried down to the rectus fascia with blunt and sharp dissection.  The rectus fascia was opened in the midline with the scalpel and extended laterally with the Wallace scissors.  The superior aspect of the fascial incision was then grasped with two Kocher clamps, elevated and the underlying rectus muscles were dissected off with blunt and sharp dissection.  In a similar fashion, the inferior aspect of the fascial incision was grasped with two Kocher clamps and the underlying rectus muscle were dissected off with blunt and sharp dissection.  The peritoneum was entered during transection of the fascia.  The uterus was noted below the fascia in the midline.   A bladder blade was inserted and the vesicouterine peritoneum was identified.  The

## 2024-04-13 LAB
COMMENT:: NORMAL
ERYTHROCYTE [DISTWIDTH] IN BLOOD BY AUTOMATED COUNT: 15.6 % (ref 11.5–14.5)
GLUCOSE BLD STRIP.AUTO-MCNC: 116 MG/DL (ref 65–117)
GLUCOSE BLD STRIP.AUTO-MCNC: 84 MG/DL (ref 65–117)
GLUCOSE BLD STRIP.AUTO-MCNC: 88 MG/DL (ref 65–117)
GLUCOSE P FAST SERPL-MCNC: 134 MG/DL (ref 65–100)
HBV SURFACE AG SER QL: <0.1 INDEX
HBV SURFACE AG SER QL: NEGATIVE
HCT VFR BLD AUTO: 23.6 % (ref 35–47)
HGB BLD-MCNC: 7.8 G/DL (ref 11.5–16)
MCH RBC QN AUTO: 26.1 PG (ref 26–34)
MCHC RBC AUTO-ENTMCNC: 33.1 G/DL (ref 30–36.5)
MCV RBC AUTO: 78.9 FL (ref 80–99)
NRBC # BLD: 0 K/UL (ref 0–0.01)
NRBC BLD-RTO: 0 PER 100 WBC
PLATELET # BLD AUTO: 157 K/UL (ref 150–400)
PMV BLD AUTO: 12 FL (ref 8.9–12.9)
RBC # BLD AUTO: 2.99 M/UL (ref 3.8–5.2)
SERVICE CMNT-IMP: NORMAL
SPECIMEN HOLD: NORMAL
WBC # BLD AUTO: 8.9 K/UL (ref 3.6–11)

## 2024-04-13 PROCEDURE — 85461 HEMOGLOBIN FETAL: CPT

## 2024-04-13 PROCEDURE — 82947 ASSAY GLUCOSE BLOOD QUANT: CPT

## 2024-04-13 PROCEDURE — 99024 POSTOP FOLLOW-UP VISIT: CPT | Performed by: OBSTETRICS & GYNECOLOGY

## 2024-04-13 PROCEDURE — 6370000000 HC RX 637 (ALT 250 FOR IP): Performed by: ANESTHESIOLOGY

## 2024-04-13 PROCEDURE — 94761 N-INVAS EAR/PLS OXIMETRY MLT: CPT

## 2024-04-13 PROCEDURE — 6370000000 HC RX 637 (ALT 250 FOR IP): Performed by: OBSTETRICS & GYNECOLOGY

## 2024-04-13 PROCEDURE — 86900 BLOOD TYPING SEROLOGIC ABO: CPT

## 2024-04-13 PROCEDURE — 6360000002 HC RX W HCPCS: Performed by: OBSTETRICS & GYNECOLOGY

## 2024-04-13 PROCEDURE — 1120000000 HC RM PRIVATE OB

## 2024-04-13 PROCEDURE — 82962 GLUCOSE BLOOD TEST: CPT

## 2024-04-13 PROCEDURE — 96372 THER/PROPH/DIAG INJ SC/IM: CPT

## 2024-04-13 PROCEDURE — 36415 COLL VENOUS BLD VENIPUNCTURE: CPT

## 2024-04-13 PROCEDURE — 86901 BLOOD TYPING SEROLOGIC RH(D): CPT

## 2024-04-13 PROCEDURE — 85027 COMPLETE CBC AUTOMATED: CPT

## 2024-04-13 RX ORDER — GLUCAGON 1 MG
1 KIT INJECTION PRN
Status: DISCONTINUED | OUTPATIENT
Start: 2024-04-13 | End: 2024-04-14 | Stop reason: HOSPADM

## 2024-04-13 RX ORDER — SIMETHICONE 80 MG
80 TABLET,CHEWABLE ORAL EVERY 6 HOURS PRN
Status: DISCONTINUED | OUTPATIENT
Start: 2024-04-13 | End: 2024-04-14 | Stop reason: HOSPADM

## 2024-04-13 RX ORDER — INSULIN LISPRO 100 [IU]/ML
0-4 INJECTION, SOLUTION INTRAVENOUS; SUBCUTANEOUS
Status: DISCONTINUED | OUTPATIENT
Start: 2024-04-13 | End: 2024-04-14 | Stop reason: HOSPADM

## 2024-04-13 RX ORDER — DEXTROSE MONOHYDRATE 100 MG/ML
INJECTION, SOLUTION INTRAVENOUS CONTINUOUS PRN
Status: DISCONTINUED | OUTPATIENT
Start: 2024-04-13 | End: 2024-04-14 | Stop reason: HOSPADM

## 2024-04-13 RX ORDER — INSULIN LISPRO 100 [IU]/ML
0-4 INJECTION, SOLUTION INTRAVENOUS; SUBCUTANEOUS NIGHTLY
Status: DISCONTINUED | OUTPATIENT
Start: 2024-04-13 | End: 2024-04-14 | Stop reason: HOSPADM

## 2024-04-13 RX ADMIN — ACETAMINOPHEN 1000 MG: 500 TABLET ORAL at 13:18

## 2024-04-13 RX ADMIN — OXYCODONE HYDROCHLORIDE AND ACETAMINOPHEN 2 TABLET: 5; 325 TABLET ORAL at 09:36

## 2024-04-13 RX ADMIN — OXYCODONE HYDROCHLORIDE AND ACETAMINOPHEN 2 TABLET: 5; 325 TABLET ORAL at 18:15

## 2024-04-13 RX ADMIN — ACETAMINOPHEN 1000 MG: 500 TABLET ORAL at 00:41

## 2024-04-13 RX ADMIN — SIMETHICONE 80 MG: 80 TABLET, CHEWABLE ORAL at 18:14

## 2024-04-13 RX ADMIN — KETOROLAC TROMETHAMINE 30 MG: 30 INJECTION INTRAMUSCULAR; INTRAVENOUS at 03:06

## 2024-04-13 RX ADMIN — HUMAN RHO(D) IMMUNE GLOBULIN 300 MCG: 300 INJECTION, SOLUTION INTRAMUSCULAR at 16:16

## 2024-04-13 RX ADMIN — DOCUSATE SODIUM 100 MG: 100 CAPSULE, LIQUID FILLED ORAL at 21:15

## 2024-04-13 RX ADMIN — DOCUSATE SODIUM 100 MG: 100 CAPSULE, LIQUID FILLED ORAL at 09:36

## 2024-04-13 RX ADMIN — IBUPROFEN 800 MG: 800 TABLET, FILM COATED ORAL at 09:36

## 2024-04-13 RX ADMIN — OXYCODONE HYDROCHLORIDE AND ACETAMINOPHEN 2 TABLET: 5; 325 TABLET ORAL at 13:18

## 2024-04-13 RX ADMIN — IBUPROFEN 800 MG: 800 TABLET, FILM COATED ORAL at 18:15

## 2024-04-13 RX ADMIN — OXYCODONE 10 MG: 5 TABLET ORAL at 04:11

## 2024-04-13 RX ADMIN — OXYCODONE 10 MG: 5 TABLET ORAL at 00:13

## 2024-04-13 RX ADMIN — FAMOTIDINE 20 MG: 20 TABLET, FILM COATED ORAL at 16:16

## 2024-04-13 ASSESSMENT — PAIN DESCRIPTION - DESCRIPTORS
DESCRIPTORS: DISCOMFORT;SORE;TENDER
DESCRIPTORS: DISCOMFORT;SORE;TENDER
DESCRIPTORS: SORE
DESCRIPTORS: DISCOMFORT;SORE;TENDER;ACHING
DESCRIPTORS: SORE

## 2024-04-13 ASSESSMENT — PAIN - FUNCTIONAL ASSESSMENT
PAIN_FUNCTIONAL_ASSESSMENT: ACTIVITIES ARE NOT PREVENTED

## 2024-04-13 ASSESSMENT — PAIN DESCRIPTION - LOCATION
LOCATION: ABDOMEN;INCISION

## 2024-04-13 ASSESSMENT — PAIN DESCRIPTION - ORIENTATION
ORIENTATION: LOWER

## 2024-04-13 ASSESSMENT — PAIN SCALES - GENERAL
PAINLEVEL_OUTOF10: 6
PAINLEVEL_OUTOF10: 8
PAINLEVEL_OUTOF10: 6
PAINLEVEL_OUTOF10: 5
PAINLEVEL_OUTOF10: 7
PAINLEVEL_OUTOF10: 6
PAINLEVEL_OUTOF10: 6

## 2024-04-13 NOTE — PROGRESS NOTES
Post-Operative  Progress Note    Patient doing well without significant complaint.   Nausea and vomiting resolved.    She is tolerating oral intake.   Pain well controlled.   Minimal lochia.    Delivery information:  Delivery Type: , Low Transverse    By Delivering Clinician:JASPREET ARAGON    Delivery Date /Time: 2024  8:14 AM      Information for the patient's :  Ro, Female Anabel [[id number]]   , Low Transverse      Vitals:  Patient Vitals for the past 12 hrs:   Temp Pulse Resp BP SpO2   24 0936 -- -- 16 -- --   24 0411 97.6 °F (36.4 °C) 82 16 117/77 100 %   24 0010 97.7 °F (36.5 °C) 94 18 128/76 100 %       Temp (24hrs), Av.5 °F (36.4 °C), Min:97.3 °F (36.3 °C), Max:97.7 °F (36.5 °C)      Last 24hr Input/Output:    Intake/Output Summary (Last 24 hours) at 2024 0949  Last data filed at 2024 0400  Gross per 24 hour   Intake --   Output 1910 ml   Net -1910 ml        Exam:    Gen: Patient without distress  Lungs: clear to ascultation bilaterally  Cor: S1, S2, regular rate and rhythm  Abdomen: bowel sounds present, soft, appropriate tenderness, fundus firm   Incision: clean, dry and intact  Lower extremities: negative for cords or tenderness, trace edema bilaterally      Labs:   Recent Results (from the past 24 hour(s))   Glucose, Fasting    Collection Time: 24  6:46 AM   Result Value Ref Range    Glucose, Fasting 134 (H) 65 - 100 MG/DL   CBC    Collection Time: 24  6:47 AM   Result Value Ref Range    WBC 8.9 3.6 - 11.0 K/uL    RBC 2.99 (L) 3.80 - 5.20 M/uL    Hemoglobin 7.8 (L) 11.5 - 16.0 g/dL    Hematocrit 23.6 (L) 35.0 - 47.0 %    MCV 78.9 (L) 80.0 - 99.0 FL    MCH 26.1 26.0 - 34.0 PG    MCHC 33.1 30.0 - 36.5 g/dL    RDW 15.6 (H) 11.5 - 14.5 %    Platelets 157 150 - 400 K/uL    MPV 12.0 8.9 - 12.9 FL    Nucleated RBCs 0.0 0  WBC    nRBC 0.00 0.00 - 0.01 K/uL   Extra Tubes Hold    Collection Time: 24  6:47 AM

## 2024-04-13 NOTE — LACTATION NOTE
success, but will be supported in decision to do bot    Pt will successfully establish breastfeeding by feeding in response to early feeding cues or wake every 3h, will obtain deep latch, and will keep log of feedings/output.  Taught to BF at hunger cues and or q 2-3 hrs and to offer 10-20 drops of hand expressed colostrum at any non-feeds.      Left Breast: Soft        Right Breast: Soft        Maternal Response: Relaxed and confident      Formula Type: Similac 360 Total Care     Latch:  (did not see at breast at this time)

## 2024-04-14 VITALS
WEIGHT: 223 LBS | TEMPERATURE: 97.7 F | OXYGEN SATURATION: 100 % | HEART RATE: 79 BPM | DIASTOLIC BLOOD PRESSURE: 74 MMHG | RESPIRATION RATE: 16 BRPM | BODY MASS INDEX: 35.99 KG/M2 | SYSTOLIC BLOOD PRESSURE: 129 MMHG

## 2024-04-14 PROBLEM — Z98.891 HISTORY OF CESAREAN DELIVERY: Status: RESOLVED | Noted: 2024-04-12 | Resolved: 2024-04-14

## 2024-04-14 LAB
ABO + RH BLD: NORMAL
BLD PROD TYP BPU: NORMAL
BLOOD BANK BLOOD PRODUCT EXPIRATION DATE: NORMAL
BLOOD BANK DISPENSE STATUS: NORMAL
BPU ID: NORMAL
COMMENT:: NORMAL
EST. AVERAGE GLUCOSE BLD GHB EST-MCNC: 134 MG/DL
FETAL SCREEN: NORMAL
GLUCOSE BLD STRIP.AUTO-MCNC: 157 MG/DL (ref 65–117)
GLUCOSE BLD STRIP.AUTO-MCNC: 99 MG/DL (ref 65–117)
HBA1C MFR BLD: 6.3 % (ref 4–5.6)
HCT VFR BLD AUTO: 22.1 % (ref 35–47)
HGB BLD-MCNC: 7.1 G/DL (ref 11.5–16)
SERVICE CMNT-IMP: ABNORMAL
SERVICE CMNT-IMP: NORMAL
SPECIMEN HOLD: NORMAL
UNIT DIVISION: 0
UNIT ISSUE DATE/TIME: NORMAL

## 2024-04-14 PROCEDURE — 85014 HEMATOCRIT: CPT

## 2024-04-14 PROCEDURE — 85018 HEMOGLOBIN: CPT

## 2024-04-14 PROCEDURE — 82962 GLUCOSE BLOOD TEST: CPT

## 2024-04-14 PROCEDURE — 99024 POSTOP FOLLOW-UP VISIT: CPT | Performed by: OBSTETRICS & GYNECOLOGY

## 2024-04-14 PROCEDURE — 83036 HEMOGLOBIN GLYCOSYLATED A1C: CPT

## 2024-04-14 PROCEDURE — 6370000000 HC RX 637 (ALT 250 FOR IP): Performed by: OBSTETRICS & GYNECOLOGY

## 2024-04-14 PROCEDURE — 94761 N-INVAS EAR/PLS OXIMETRY MLT: CPT

## 2024-04-14 PROCEDURE — 36415 COLL VENOUS BLD VENIPUNCTURE: CPT

## 2024-04-14 RX ORDER — IBUPROFEN 800 MG/1
800 TABLET ORAL EVERY 8 HOURS PRN
Qty: 30 TABLET | Refills: 0 | Status: SHIPPED | OUTPATIENT
Start: 2024-04-14 | End: 2024-04-21

## 2024-04-14 RX ORDER — OXYCODONE HYDROCHLORIDE AND ACETAMINOPHEN 5; 325 MG/1; MG/1
1 TABLET ORAL EVERY 4 HOURS PRN
Qty: 12 TABLET | Refills: 0 | Status: SHIPPED | OUTPATIENT
Start: 2024-04-14 | End: 2024-04-17

## 2024-04-14 RX ADMIN — IBUPROFEN 800 MG: 800 TABLET, FILM COATED ORAL at 08:02

## 2024-04-14 RX ADMIN — DOCUSATE SODIUM 100 MG: 100 CAPSULE, LIQUID FILLED ORAL at 08:02

## 2024-04-14 RX ADMIN — OXYCODONE HYDROCHLORIDE AND ACETAMINOPHEN 2 TABLET: 5; 325 TABLET ORAL at 02:27

## 2024-04-14 ASSESSMENT — PAIN DESCRIPTION - LOCATION
LOCATION: INCISION
LOCATION: ABDOMEN;INCISION

## 2024-04-14 ASSESSMENT — PAIN DESCRIPTION - DESCRIPTORS
DESCRIPTORS: ACHING;SORE
DESCRIPTORS: SORE

## 2024-04-14 ASSESSMENT — PAIN SCALES - GENERAL
PAINLEVEL_OUTOF10: 5
PAINLEVEL_OUTOF10: 7

## 2024-04-14 ASSESSMENT — PAIN DESCRIPTION - ORIENTATION: ORIENTATION: LOWER

## 2024-04-14 NOTE — PROGRESS NOTES
Post-Operative  Progress Note    Patient doing well without significant complaint.   Nausea and vomiting resolved.    She is tolerating oral intake.   Pain well controlled.   Pt co nausea when she stands up: comes in a wave.  She also complains of some low back pain.  She has questions about anemia.    Delivery information:  Delivery Type: , Low Transverse    By Delivering Clinician:JASPREET ARAGON    Delivery Date /Time: 2024  8:14 AM      Information for the patient's :  Ro, Female Anabel [[id number]]   , Low Transverse      Vitals:  Patient Vitals for the past 12 hrs:   Temp Pulse Resp BP SpO2   24 0837 97.7 °F (36.5 °C) 79 16 129/74 100 %       Temp (24hrs), Av.8 °F (36.6 °C), Min:97.5 °F (36.4 °C), Max:98.2 °F (36.8 °C)      Last 24hr Input/Output:  No intake or output data in the 24 hours ending 24 1320     Exam:    Gen: Patient without distress  Abdomen: bowel sounds present, soft, appropriate tenderness, fundus firm   Incision: clean, dry and intact  Lower extremities: negative for cords or tenderness, trace edema bilaterally      Labs:   Recent Results (from the past 24 hour(s))   POCT Glucose    Collection Time: 24  6:18 PM   Result Value Ref Range    POC Glucose 116 65 - 117 mg/dL    Performed by: Pranav Quintanilla    POCT Glucose    Collection Time: 24  9:07 PM   Result Value Ref Range    POC Glucose 84 65 - 117 mg/dL    Performed by: Yakelin Carvlaho V    POCT Glucose    Collection Time: 24  2:30 AM   Result Value Ref Range    POC Glucose 157 (H) 65 - 117 mg/dL    Performed by: Yakelin Carvalho V    Hemoglobin A1c    Collection Time: 24  6:25 AM   Result Value Ref Range    Hemoglobin A1C 6.3 (H) 4.0 - 5.6 %    Estimated Avg Glucose 134 mg/dL   Hemoglobin and Hematocrit    Collection Time: 24  6:25 AM   Result Value Ref Range    Hemoglobin 7.1 (L) 11.5 - 16.0 g/dL    Hematocrit 22.1 (L) 35.0 - 47.0 %   Extra Tubes Hold

## 2024-04-14 NOTE — DISCHARGE SUMMARY
Obstetrical Discharge Summary     Name: Anabel Starr MRN: [id number]  SSN: [id number]    YOB: 1990  Age: 33 y.o.  Sex: female      Admit Date: 2024    Discharge Date:  2024      Admitting Physician: Jaspreet Fung MD     Attending Physician:  Jaspreet Fung MD, FACOG    Admission Diagnoses: Previous  delivery affecting pregnancy [O34.219]  History of  delivery [Z98.891]  H/O:  [Z98.891]    Condition on Discharge: Stable    Procedures: repeat LTCS    Disposition: to home    Follow up:    Call  as soon as possible to schedule for your six week postpartum visit.    Follow-up Information       Follow up With Specialties Details Why Contact Info    Jaspreet Fung MD Obstetrics & Gynecology, Gynecology, Obstetrics Follow up in 6 week(s) and 2 hour fasting glucose test [id number] [facility]  [address]  [facility] [address] [phone]      Jaspreet Fung MD Obstetrics & Gynecology, Gynecology, Obstetrics Follow up in 1 week(s) incision check [id number] [facility]  [address]  [facility] [id number] [phone]               Discharge Diagnoses:   Sybil Starr [[id number]]       Information    Head delivery date/time: 2024 08:14:00   Changing the 's delivery date/time could affect patient care.:      Delivery date/time:  24   Delivery type: , Low Transverse    Details:  Trial of labor?: No    categorization: Repeat    priority: scheduled   Indications for : Prior Uterine Surgery   Skin Incision Type: Low Transverse     Uterine Incision: Low Transverse                    Delivery Type: , Low Transverse    By Delivering Clinician:JASPREET FUNG    Delivery Date /Time: 2024  8:14 AM        Additional Diagnoses:   Patient Active Problem List   Diagnosis    Prenatal care of multigravida, antepartum    Supervision of multigravida of advanced maternal age,

## 2024-04-14 NOTE — DISCHARGE INSTRUCTIONS
[facility]  [email or web address]  [phone]    Stacey Fung MD, FACOG     POST DELIVERY DISCHARGE INSTRUCTIONS  FROM YOUR PHYSICIAN    Name: Anabel Starr  YOB: 1990    General:     Read all discharge information provided by the hospital    Diet/Diet Restrictions:  Eat healthy meals and snacks as desired.    Eat foods that are high in fiber and low in fat and cholesterol.  Drink eight 8-ounce glasses of water daily; avoid excessive caffeine intake.  http://www.[employer].gov/healthy-eating-tips.html  http://www.[employer].gov/pregnancy-breastfeeding.html    Medications:   See discharge medication list and read instructions carefully.    For Anemia:   Your results show some anemia, or low blood count.   You should start, or add, an over the counter elemental iron supplement of 45-65 mg.  Consider 'Slow FE' or ferrous sulfate 325 mg once a day for at least three months.  Also take vitamin C 250 mg and a daily prenatal vitamin to help anemia.  Add a stool softener, like docusate or colace 100 mg (2x/day) to avoid constipation.  If you do not tolerate supplements you can try blackstrap molasses (1 tbsp=27mg elemental iron).    Breast Feeding:  See instructions from your lactation consult.  Call [phone] [[phone]] for more information or to locate a lactation consultant.  http://www.cdc.gov/breastfeeding/index.htm    Vaccines:  If you received the MMR vaccine postpartum you should wait three months until you get pregnant again.    You, and close contacts, should make sure that the Tdap vaccine is up to date.    This vaccine can decrease the risk of your baby getting pertussis or \"whooping cough.\"    You, and close contacts, should receive the influenza vaccine during flu season when appropriate.  http://www.cdc.gov/vaccines/hcp/acip-recs/index.html    Tobacco Use:  If you (or other people around the baby) smoke or use tobacco products, please try to  use

## 2024-04-15 LAB — T PALLIDUM AB SER QL IA: NON REACTIVE

## 2024-04-16 LAB
ABO + RH BLD: NORMAL
BLD PROD TYP BPU: NORMAL
BLD PROD TYP BPU: NORMAL
BLOOD BANK DISPENSE STATUS: NORMAL
BLOOD BANK DISPENSE STATUS: NORMAL
BLOOD GROUP ANTIBODIES SERPL: NORMAL
BLOOD GROUP ANTIBODIES SERPL: NORMAL
BPU ID: NORMAL
BPU ID: NORMAL
CROSSMATCH RESULT: NORMAL
CROSSMATCH RESULT: NORMAL
SPECIMEN EXP DATE BLD: NORMAL
UNIT DIVISION: 0
UNIT DIVISION: 0

## 2024-04-26 ENCOUNTER — OFFICE VISIT (OUTPATIENT)
Age: 34
End: 2024-04-26

## 2024-04-26 VITALS
HEART RATE: 101 BPM | WEIGHT: 205 LBS | SYSTOLIC BLOOD PRESSURE: 126 MMHG | BODY MASS INDEX: 33.09 KG/M2 | DIASTOLIC BLOOD PRESSURE: 76 MMHG

## 2024-04-26 DIAGNOSIS — O99.810 ABNORMAL MATERNAL GLUCOSE TOLERANCE, ANTEPARTUM: ICD-10-CM

## 2024-04-26 DIAGNOSIS — Z98.891 H/O: C-SECTION: Primary | ICD-10-CM

## 2024-04-26 NOTE — PROGRESS NOTES
Rooming note, gyn problem visit:    Chief Complaint   Patient presents with    Incision Check      on 2024    2hr Glucose Tolerance Test     Anabel Starr is a 33 y.o. female presents for a problem visit.    No LMP recorded.  Birth Control: recent pregnancy.    Last or next WWE is: due in 3+ months    The patient is reporting having:  on 2024. Pt reports incision is doing well, she has not bothered it since surgery, is not taking norco, is still taking 800mg ibuprofen, reports pain is well managed.     Postpartum visit 2024, wants birth control pills for contraception.   Pt reports she is not producing much milk, ~40mL per day, has not consulted with lactation specialist, warm line & recourses given & advised she can fu with pediatrician office for lactation consult if needed.      2hr fasting GTT today d/t GDM & elevated fasting level per Dr. Fung.     This is a new problem.  She has experienced this problem before.    She reports the symptoms are has improved.  Aggravating factors include none.  And alleviating factors include none.    She does not have other concerns.

## 2024-04-26 NOTE — PROGRESS NOTES
[facility]  [email or web address]  [phone]    Stacey Fung MD, FACOG     INCISION CHECK PROGRESS NOTE    Subjective:     Chief Complaint   Patient presents with    Incision Check      on 2024    2hr Glucose Tolerance Test        Anabel Starr is a 33 y.o. female who presents today for wound check. She has a surgical incision wound which is located on her abdomen.      Current symptoms: none  wound healing as expected.   Interventions to date: none.    Past Medical History:   Diagnosis Date    Gestational diabetes 2024    Pap smear for cervical cancer screening 2023    normal; HPV not tested     Past Surgical History:   Procedure Laterality Date     SECTION N/A 2024     SECTION performed by Stacey Fung MD at [facility] L&D OR     Current Outpatient Medications   Medication Sig    ibuprofen (ADVIL;MOTRIN) 800 MG tablet Take 1 tablet by mouth every 8 hours as needed for Pain Take with food.    Prenatal Vit-Fe Fumarate-FA (PRENATAL 1+1 PO) Take by mouth     No current facility-administered medications for this visit.     Allergies   Allergen Reactions    Codeine Anaphylaxis, Hives and Shortness Of Breath     Tolerates oxycodone     Honey Hives and Itching     Family History   Problem Relation Age of Onset    Hypertension Maternal Grandmother      Social History     Socioeconomic History    Marital status:      Spouse name: Not on file    Number of children: Not on file    Years of education: Not on file    Highest education level: Not on file   Occupational History    Not on file   Tobacco Use    Smoking status: Never    Smokeless tobacco: Never   Vaping Use    Vaping Use: Never used   Substance and Sexual Activity    Alcohol use: Not Currently     Comment: ocassionally- socially and stopped last week    Drug use: Never    Sexual activity: Not on file   Other Topics Concern    Not on file   Social History Narrative    Not on

## 2024-04-27 LAB
GLUCOSE 1H P 75 G GLC PO SERPL-MCNC: ABNORMAL MG/DL
GLUCOSE 2H P 75 G GLC PO SERPL-MCNC: 80 MG/DL (ref 70–152)
GLUCOSE P FAST SERPL-MCNC: 100 MG/DL (ref 70–91)

## 2024-05-23 ENCOUNTER — POSTPARTUM VISIT (OUTPATIENT)
Age: 34
End: 2024-05-23

## 2024-05-23 VITALS
WEIGHT: 203 LBS | BODY MASS INDEX: 32.62 KG/M2 | DIASTOLIC BLOOD PRESSURE: 77 MMHG | HEIGHT: 66 IN | SYSTOLIC BLOOD PRESSURE: 113 MMHG | TEMPERATURE: 98 F | HEART RATE: 83 BPM

## 2024-05-23 DIAGNOSIS — F41.9 ANXIETY: ICD-10-CM

## 2024-05-23 DIAGNOSIS — N63.10 MASS OF RIGHT BREAST, UNSPECIFIED QUADRANT: ICD-10-CM

## 2024-05-23 DIAGNOSIS — Z98.891 H/O: C-SECTION: ICD-10-CM

## 2024-05-23 PROCEDURE — 0503F POSTPARTUM CARE VISIT: CPT | Performed by: OBSTETRICS & GYNECOLOGY

## 2024-05-23 RX ORDER — MEDROXYPROGESTERONE ACETATE 150 MG/ML
150 INJECTION, SUSPENSION INTRAMUSCULAR ONCE
Qty: 1 ML | Refills: 1 | Status: SHIPPED | OUTPATIENT
Start: 2024-05-23 | End: 2024-05-23

## 2024-05-23 NOTE — PROGRESS NOTES
Anabel Starr is a 33 y.o. female returns for a routine post-partum follow-up visit     No chief complaint on file.      Postpartum Depression: Medium Risk (2024)    [place]  Depression Scale     Last EPDS Total Score: 5     Last EPDS Self Harm Result: Never         Type of delivery: repeat  section  Date of Delivery: 2024  Breastfeeding: yes  Bleeding Resolved: yes as of 3 days ago  Birth Control: none. Wants to discuss options. Hx of OCP that she conceived while being on them.   Last Pap: 2023 WNL    24 2hr PP gtt abn, rec PCP fu; pt plans to fu with PCP.       Problems: c/o right breast pain x 3 weeks, unsure where the pain is coming from, reports . Reports producing scant amount of milk.       Examination chaperoned by Amena Stein MA.  
present  Bladder: non-tender to palpation  Urethra: appears normal  Cervix: normal   Uterus: normal size, shape and consistency  Adnexa: no adnexal tenderness present, no adnexal masses present  Perineum: perineum within normal limits, no evidence of trauma, no rashes or skin lesions present  Anus: anus within normal limits, no hemorrhoids present  Inguinal Lymph Nodes: no lymphadenopathy present    Skin  General Inspection: no rash, no lesions identified  Inc : healing well    Neurologic/Psychiatric  Mental Status:  Orientation: grossly oriented to person, place and time  Mood and Affect: mood normal, affect appropriate      Assessment:  Postpartum exam, doing well  Encounter Diagnoses   Name Primary?    H/O:      Postpartum care and examination Yes    Mass of right breast, unspecified quadrant        Plan:  RTO for AE or sooner prn: ~ 3 months  We discussed typical postpartum healing and recovery  We discussed typical postpartum bleeding patterns, rec bleeding log and notify MD if any concerns.  We discussed some women do not have regular cycles while pumping or breast feeding.    Patient advised that she should gradually return to normal physical activity, and notify MD if unable to tolerate return to normal activity.  We discussed the option of postpartum PT if desired or needed.    We discussed vaginal lubrication as needed for intercourse  I recommend healthy balanced diet, regular exercise  Recommend continuing on prenatal vitamins once per day, especially if breast feeding or pumping.    We discussed safer sex practices, condom use and risk factors for sexually transmitted diseases.   We discussed progesterone only and non hormonal options for contraception including but not limited to condoms, IUDs, Nexplanon, and depo provera while pumping or breastfeeding.  Discussed risks, benefits and alternatives of OCP/nuvaring/patch when not breast feeding/pumping: including but not limited to

## 2024-05-24 ENCOUNTER — NURSE ONLY (OUTPATIENT)
Age: 34
End: 2024-05-24
Payer: COMMERCIAL

## 2024-05-24 VITALS — WEIGHT: 201.8 LBS | DIASTOLIC BLOOD PRESSURE: 80 MMHG | SYSTOLIC BLOOD PRESSURE: 126 MMHG | BODY MASS INDEX: 32.57 KG/M2

## 2024-05-24 DIAGNOSIS — Z30.42 SURVEILLANCE FOR DEPO-PROVERA CONTRACEPTION: ICD-10-CM

## 2024-05-24 DIAGNOSIS — Z76.89 ENCOUNTER FOR MENSTRUAL REGULATION: Primary | ICD-10-CM

## 2024-05-24 LAB
HCG, PREGNANCY, URINE, POC: NEGATIVE
VALID INTERNAL CONTROL, POC: YES

## 2024-05-24 PROCEDURE — 81025 URINE PREGNANCY TEST: CPT | Performed by: STUDENT IN AN ORGANIZED HEALTH CARE EDUCATION/TRAINING PROGRAM

## 2024-05-24 PROCEDURE — 96372 THER/PROPH/DIAG INJ SC/IM: CPT | Performed by: OBSTETRICS & GYNECOLOGY

## 2024-05-24 RX ORDER — MEDROXYPROGESTERONE ACETATE 150 MG/ML
150 INJECTION, SUSPENSION INTRAMUSCULAR ONCE
Status: COMPLETED | OUTPATIENT
Start: 2024-05-24 | End: 2024-05-24

## 2024-05-24 RX ADMIN — MEDROXYPROGESTERONE ACETATE 150 MG: 150 INJECTION, SUSPENSION INTRAMUSCULAR at 11:19

## 2024-05-24 NOTE — PROGRESS NOTES
DEPO INJECTION NOTE  ~~~~~~~~~~~~~~~~~~~~~     Last depo injection: Initial injection  Last ae: due in 3 months  Pregnancy test? negative   Side effects? None reported   Depo-Provera 150mg injection IM, given by Yamila Desir LPN in right glute  Next depo injection due: Aug 9 - Aug 23      Administered 150mg/mL Depo-Provera per orders of KOBE TRIMBLE NURSE . Verbal consent received by patient & injection given. Patient tolerated well, no complications and no side effects. Encouraged her to remain in clinic for 15 minutes and immediately report any adverse reactions. Patient informed of next injection date ranges and encouraged to schedule. She verbalized understanding and expressed intent to comply.

## 2024-06-05 ENCOUNTER — OFFICE VISIT (OUTPATIENT)
Age: 34
End: 2024-06-05
Payer: COMMERCIAL

## 2024-06-05 VITALS — WEIGHT: 205 LBS | BODY MASS INDEX: 32.95 KG/M2 | HEIGHT: 66 IN

## 2024-06-05 DIAGNOSIS — N61.0 MASTITIS: Primary | ICD-10-CM

## 2024-06-05 PROCEDURE — 76642 ULTRASOUND BREAST LIMITED: CPT | Performed by: SURGERY

## 2024-06-05 PROCEDURE — 99203 OFFICE O/P NEW LOW 30 MIN: CPT | Performed by: SURGERY

## 2024-06-05 RX ORDER — DOXYCYCLINE 100 MG/1
100 CAPSULE ORAL 2 TIMES DAILY
Qty: 20 CAPSULE | Refills: 0 | Status: SHIPPED | OUTPATIENT
Start: 2024-06-05

## 2024-06-05 NOTE — PROGRESS NOTES
HISTORY OF PRESENT ILLNESS  Anabel Starr is a 34 y.o. female.    HPI  NEW patient consult referred by Dr. Fung for RIGHT breast mass. Pt gave birth in April. She noticed pain 2 weeks after stating breast feeding. After a month in, the pain worsen. Saw OB for 6 week appt.  A lump behind / around the nipple was felt. Patient has since stopped breast feeding and pain is still there. No skin changes no discharge.        Family History:  None to note     Mammogram, n/a, BIRADS n/a    Past Medical History:   Diagnosis Date    Gestational diabetes 2024    Pap smear for cervical cancer screening 2023    normal; HPV not tested    Pre-diabetes 2024       Past Surgical History:   Procedure Laterality Date     SECTION N/A 2024     SECTION performed by Stacey Fung MD at [facility] L&D OR       Social History     Socioeconomic History    Marital status:      Spouse name: Not on file    Number of children: Not on file    Years of education: Not on file    Highest education level: Not on file   Occupational History    Not on file   Tobacco Use    Smoking status: Never    Smokeless tobacco: Never   Vaping Use    Vaping Use: Never used   Substance and Sexual Activity    Alcohol use: Not Currently     Comment: ocassionally- socially and stopped last week    Drug use: Never    Sexual activity: Not on file   Other Topics Concern    Not on file   Social History Narrative    Not on file     Social Determinants of Health     Financial Resource Strain: Not on file   Food Insecurity: No Food Insecurity (2024)    Hunger Vital Sign     Worried About Running Out of Food in the Last Year: Never true     Ran Out of Food in the Last Year: Never true   Transportation Needs: No Transportation Needs (2024)    PRAPARE - Transportation     Lack of Transportation (Medical): No     Lack of Transportation (Non-Medical): No   Physical Activity: Not on file   Stress: Not on file   Social

## 2024-06-05 NOTE — PROGRESS NOTES
HISTORY OF PRESENT ILLNESS  Anabel Starr is a 34 y.o. female     HPI NEW patient consult referred by  Dr. Fung  for RIGHT breast mass. Pt gave birth in April. She noticed pain 2 weeks after stating breast feeding. A month in pain  worsen. Saw OB  for 6 week appt.  A lump behind / around the nipple was felt. Patient has since stopped breast feeding and pain is still there. No skin changes no discharge.      Family History:  None to note    Mammogram, n/a, BIRADS n/a      Review of Systems      Physical Exam       ASSESSMENT and PLAN  {Assessment and Plan Chronic Disease:[id number]}

## 2024-07-02 ENCOUNTER — PATIENT MESSAGE (OUTPATIENT)
Age: 34
End: 2024-07-02

## 2024-11-12 RX ORDER — MEDROXYPROGESTERONE ACETATE 150 MG/ML
150 INJECTION, SUSPENSION INTRAMUSCULAR ONCE
Qty: 1 ML | Refills: 1 | OUTPATIENT
Start: 2024-11-12 | End: 2024-11-12

## 2024-11-12 NOTE — PROGRESS NOTES
Current patient location: Patient declined to provide     Is the patient currently in the state of [place]? YES    Visit mode:TELEPHONE    If the visit is dropped, the patient can be reconnected by:TELEPHONE VISIT: Phone number:   Telephone Information:   Mobile [phone]       Will anyone else be joining the visit? NO  (If patient encounters technical issues they should call [phone]    Are changes needed to the allergy or medication list? No    Are refills needed on medications prescribed by this physician? NO    Rooming Documentation:  Not applicable    Reason for visit: RECHECK    Maritza RUTH       Rooming note, New OB visit. Hesham Kaplan is a 26 y.o. female presents for a new pregnancy visit and to establish routine OB care. Chief Complaint   Patient presents with    Initial Prenatal Visit       Last Pap smear: unknown  LMP history:  Patient's last menstrual period was 2023 (exact date). The date of her LMP is certain  Her last menstrual period was normal  Her menstrual cycle is regular. A urine pregnancy test was positive 4 weeks ago. She was not on contraception at time of conception. Based on her LMP, her EGA is 7 weeks 6 days giving an [facility] of 2024. Ultrasound data:  She had an ultrasound done by the ultrasound tech today which revealed a viable white pregnancy with a gestational age of 9 weeks and 6 days giving an [facility] of 2024. A SINGLE VIABLE 7W6D IUP IS SEEN WITH NORMAL CARDIAC RHYTHM. GESTATIONAL AGE BASED ON TODAY'S ULTRASOUND. A NORMAL YOLK SAC IS SEEN. THERE APPEARS TO BE A SUBCHORIONIC HEMORRHAGE THAT MEASURES 20 X 6 X 14MM. THERE APPEARS TO BE A POSTERIOR FIBROID THAT MEASURES 65 X 60 X 63MM. RIGHT ADNEXA APPEARS WITHIN NORMAL LIMITS. LEFT ADNEXA APPEARS WITHIN NORMAL LIMITS. NO FREE FLUID SEEN IN THE CDS. Pregnancy symptoms:  She reports fatigue  nausea. She denies breast tenderness. She has not experienced  vomiting over the last few weeks. She has not experienced abnormal bleeding since she found out she was pregnant. She states she has loss  5 pounds. Her prepregnancy weight was 204 pounds. Relevant past pregnancy history:   She has the following pregnancy history: delivery at 36 weeks, gestational diabetes     She does have a history of  delivery (< 37 weeks). She does have a history of a  section delivery. She does have a history of gestational diabetes, or pre-gestational diabetes. She does not have a history of gestational hypertension, chronic hypertension or preeclampsia.     She has had previous genetic

## 2024-11-15 RX ORDER — MEDROXYPROGESTERONE ACETATE 150 MG/ML
150 INJECTION, SUSPENSION INTRAMUSCULAR ONCE
Qty: 1 ML | Refills: 1 | OUTPATIENT
Start: 2024-11-15 | End: 2024-11-15

## 2025-01-17 RX ORDER — MEDROXYPROGESTERONE ACETATE 150 MG/ML
150 INJECTION, SUSPENSION INTRAMUSCULAR ONCE
Qty: 1 ML | Refills: 1 | OUTPATIENT
Start: 2025-01-17 | End: 2025-01-17

## 2025-03-13 ENCOUNTER — OFFICE VISIT (OUTPATIENT)
Age: 35
End: 2025-03-13
Payer: COMMERCIAL

## 2025-03-13 VITALS — DIASTOLIC BLOOD PRESSURE: 72 MMHG | SYSTOLIC BLOOD PRESSURE: 108 MMHG | BODY MASS INDEX: 29.54 KG/M2 | WEIGHT: 183 LBS

## 2025-03-13 DIAGNOSIS — N94.6 DYSMENORRHEA: ICD-10-CM

## 2025-03-13 DIAGNOSIS — N93.9 ABNORMAL UTERINE BLEEDING: ICD-10-CM

## 2025-03-13 DIAGNOSIS — Z30.09 FAMILY PLANNING: ICD-10-CM

## 2025-03-13 DIAGNOSIS — N92.0 MENORRHAGIA WITH REGULAR CYCLE: ICD-10-CM

## 2025-03-13 DIAGNOSIS — N93.9 ABNORMAL UTERINE BLEEDING: Primary | ICD-10-CM

## 2025-03-13 LAB
HCG, PREGNANCY, URINE, POC: NEGATIVE
VALID INTERNAL CONTROL, POC: YES

## 2025-03-13 PROCEDURE — 99459 PELVIC EXAMINATION: CPT | Performed by: OBSTETRICS & GYNECOLOGY

## 2025-03-13 PROCEDURE — 81025 URINE PREGNANCY TEST: CPT | Performed by: OBSTETRICS & GYNECOLOGY

## 2025-03-13 PROCEDURE — 99214 OFFICE O/P EST MOD 30 MIN: CPT | Performed by: OBSTETRICS & GYNECOLOGY

## 2025-03-13 RX ORDER — NORELGESTROMIN AND ETHINYL ESTRADIOL 35; 150 UG/MG; UG/MG
1 PATCH TRANSDERMAL WEEKLY
Qty: 12 PATCH | Refills: 0 | Status: SHIPPED | OUTPATIENT
Start: 2025-03-13

## 2025-03-13 NOTE — PROGRESS NOTES
Rooming note, gyn problem visit:    Chief Complaint   Patient presents with    Abnormal uterine bleeding     Anabel Starr is a 34 y.o. female presents for a problem visit.    No LMP recorded.  Birth Control: Depo.    Last or next WWE is: Due     The patient is reporting having: Pt had consistent spotting since starting depo May of 2024, after her last depo injection in November she had one week break of no bleeding but starting having increased bleeding filling about two pads a day. She would like to discuss other options for contraception.   This is a new problem.  She has not experienced this problem before.    She reports the symptoms are is unchanged.  Aggravating factors include none.  And alleviating factors include none.    She does not have other concerns.      1. Have you been to the ER, urgent care clinic, or hospitalized since your last visit?{  no    2. Have you seen or consulted any other health care providers outside of the [facility] since your last visit?  no  
q 7 days as directed, rotate sites, x 3 consecutive weeks. Then do not apply a patch on week four. Repeat.     Dispense:  12 patch     Refill:  0       Results for orders placed or performed in visit on 03/13/25   AMB POC URINE PREGNANCY TEST, VISUAL COLOR COMPARISON   Result Value Ref Range    Valid Internal Control, POC yes     HCG, Pregnancy, Urine, POC Negative        Return for FU and US and possible endo bx ~ 6wks rotate provider.    A pelvic exam and associated supplies were necessary for evaluation of the patient for this type of visit.        Provider chaperoned and assisted by: Yamila Desir LPN for pelvic exam and additional supplies used for pelvic exam.        The patient (or guardian, if applicable) and other individuals in attendance with the patient were advised that Artificial Intelligence will be utilized during this visit to record, process the conversation to generate a clinical note, and support improvement of the AI technology. The patient (or guardian, if applicable) and other individuals in attendance at the appointment consented to the use of AI, including the recording.

## 2025-03-14 ENCOUNTER — RESULTS FOLLOW-UP (OUTPATIENT)
Age: 35
End: 2025-03-14

## 2025-03-14 LAB
ERYTHROCYTE [DISTWIDTH] IN BLOOD BY AUTOMATED COUNT: 15.4 % (ref 11.5–14.5)
HCT VFR BLD AUTO: 38.6 % (ref 35–47)
HGB BLD-MCNC: 12.6 G/DL (ref 11.5–16)
MCH RBC QN AUTO: 26.6 PG (ref 26–34)
MCHC RBC AUTO-ENTMCNC: 32.6 G/DL (ref 30–36.5)
MCV RBC AUTO: 81.4 FL (ref 80–99)
NRBC # BLD: 0 K/UL (ref 0–0.01)
NRBC BLD-RTO: 0 PER 100 WBC
PLATELET # BLD AUTO: 286 K/UL (ref 150–400)
PMV BLD AUTO: 11.9 FL (ref 8.9–12.9)
RBC # BLD AUTO: 4.74 M/UL (ref 3.8–5.2)
TSH SERPL DL<=0.05 MIU/L-ACNC: 1.39 UIU/ML (ref 0.36–3.74)
WBC # BLD AUTO: 5.7 K/UL (ref 3.6–11)

## 2025-03-16 LAB
C TRACH RRNA SPEC QL NAA+PROBE: NEGATIVE
N GONORRHOEA RRNA SPEC QL NAA+PROBE: NEGATIVE
T VAGINALIS RRNA SPEC QL NAA+PROBE: NEGATIVE

## 2025-04-24 DIAGNOSIS — N93.9 ABNORMAL UTERINE BLEEDING: Primary | ICD-10-CM

## 2025-04-30 ENCOUNTER — OFFICE VISIT (OUTPATIENT)
Age: 35
End: 2025-04-30

## 2025-04-30 VITALS
DIASTOLIC BLOOD PRESSURE: 69 MMHG | HEIGHT: 66 IN | BODY MASS INDEX: 27.64 KG/M2 | SYSTOLIC BLOOD PRESSURE: 104 MMHG | WEIGHT: 172 LBS | HEART RATE: 88 BPM

## 2025-04-30 DIAGNOSIS — N93.9 ABNORMAL UTERINE BLEEDING: Primary | ICD-10-CM

## 2025-04-30 LAB
HCG, PREGNANCY, URINE, POC: NEGATIVE
VALID INTERNAL CONTROL, POC: NORMAL

## 2025-04-30 SDOH — ECONOMIC STABILITY: FOOD INSECURITY: WITHIN THE PAST 12 MONTHS, YOU WORRIED THAT YOUR FOOD WOULD RUN OUT BEFORE YOU GOT MONEY TO BUY MORE.: NEVER TRUE

## 2025-04-30 SDOH — ECONOMIC STABILITY: TRANSPORTATION INSECURITY
IN THE PAST 12 MONTHS, HAS THE LACK OF TRANSPORTATION KEPT YOU FROM MEDICAL APPOINTMENTS OR FROM GETTING MEDICATIONS?: NO

## 2025-04-30 SDOH — ECONOMIC STABILITY: INCOME INSECURITY: IN THE LAST 12 MONTHS, WAS THERE A TIME WHEN YOU WERE NOT ABLE TO PAY THE MORTGAGE OR RENT ON TIME?: NO

## 2025-04-30 SDOH — ECONOMIC STABILITY: FOOD INSECURITY: WITHIN THE PAST 12 MONTHS, THE FOOD YOU BOUGHT JUST DIDN'T LAST AND YOU DIDN'T HAVE MONEY TO GET MORE.: NEVER TRUE

## 2025-04-30 ASSESSMENT — PATIENT HEALTH QUESTIONNAIRE - PHQ9
SUM OF ALL RESPONSES TO PHQ QUESTIONS 1-9: 0
2. FEELING DOWN, DEPRESSED OR HOPELESS: NOT AT ALL
1. LITTLE INTEREST OR PLEASURE IN DOING THINGS: NOT AT ALL
SUM OF ALL RESPONSES TO PHQ QUESTIONS 1-9: 0
2. FEELING DOWN, DEPRESSED OR HOPELESS: NOT AT ALL
1. LITTLE INTEREST OR PLEASURE IN DOING THINGS: NOT AT ALL
SUM OF ALL RESPONSES TO PHQ QUESTIONS 1-9: 0
SUM OF ALL RESPONSES TO PHQ9 QUESTIONS 1 & 2: 0
SUM OF ALL RESPONSES TO PHQ QUESTIONS 1-9: 0

## 2025-04-30 NOTE — PROGRESS NOTES
Anabel Starr is a 34 y.o. female presents for a problem visit.    Chief Complaint   Patient presents with    Follow-up    Ultrasound     Patient's last menstrual period was 04/11/2025 (exact date).  Birth Control: patch.  Last Pap: see report obtained 1 year(s) ago.    The patient is reporting having: no new concern    1. Have you been to the ER, urgent care clinic, or hospitalized since your last visit? No    2. Have you seen or consulted any other health care providers outside of the [facility] since your last visit? No    Examination chaperoned by Lily Linares MA.  
Procedure note: Endometrial biopsy    Anabel Starr is a ,  34 y.o. female Black /  Patient's last menstrual period was 2025 (exact date).  The patient has a history of The encounter diagnosis was Abnormal uterine bleeding. and presents for an endometrial biopsy.   Indications:   After the indications, risks, benefits, and alternatives to performing an endometrial biopsy were explained to the patient, her questions were answered and informed consent was obtained.   Procedure:  The patient was placed on the table in the dorsal lithotomy position. A bimanual exam showed the uterus to be anterior. The uterus was enlarged.  A speculum was placed in the vagina. The cervix was visualized and prepped with zephrin. A tenaculum was placed on the anterior lip of the cervix for traction. It was not necessary to dilate the cervix.   A pipelle was passed through the endocervical canal without difficulty. The uterus was sounded to 7 cm's. A moderate amount of tissue was returned. This tissue was placed in formalin and sent to pathology.   It was felt that an adequate sample was obtained.   The patient tolerated the procedure well and she reported mild cramping. The tenaculum and speculum were removed.   Post Procedural Status:  The patient was observed for 10 minutes after the procedure. She had mild cramping at the time of discharge. There were no complications.   The patient was discharged in stable condition.     
4/14/24 5/23/24  Stacey Fung MD   Prenatal Vit-Fe Fumarate-FA (PRENATAL 1+1 PO) Take by mouth    Provider, MD Violetta        Review of Systems: History obtained from the patient  Constitutional: negative for weight loss, fever, night sweats  Breast: negative for breast lumps, nipple discharge, galactorrhea  GI: negative for change in bowel habits, abdominal pain, black or bloody stools  : negative for frequency, dysuria, hematuria, vaginal discharge  MSK: negative for back pain, joint pain, muscle pain  Skin: negative for itching, rash, hives  Psych: negative for anxiety, depression, change in mood      Objective:  /69   Pulse 88   Ht 1.676 m (5' 6\")   Wt 78 kg (172 lb)   LMP 04/11/2025 (Exact Date)   BMI 27.76 kg/m²     Physical Exam:   PHYSICAL EXAMINATION    Constitutional  Appearance: well-nourished, well developed, alert, in no acute distress      Gastrointestinal  Abdominal Examination: abdomen non-tender to palpation, normal bowel sounds, no masses present  Liver and spleen: no hepatomegaly present, spleen not palpable  Hernias: no hernias identified    Genitourinary  External Genitalia: normal appearance for age, no discharge present, no tenderness present, no inflammatory lesions present, no masses present, no atrophy present  Vagina: normal vaginal vault without central or paravaginal defects, no discharge present, no inflammatory lesions present, no masses present  Bladder: non-tender to palpation  Urethra: appears normal  Cervix: normal   Uterus: normal size, shape and consistency  Adnexa: no adnexal tenderness present, no adnexal masses present  Perineum: perineum within normal limits, no evidence of trauma, no rashes or skin lesions present  Anus: anus within normal limits, no hemorrhoids present  Inguinal Lymph Nodes: no lymphadenopathy present    Skin  General Inspection: no rash, no lesions identified    Neurologic/Psychiatric  Mental Status:  Orientation: grossly oriented to

## 2025-05-05 ENCOUNTER — RESULTS FOLLOW-UP (OUTPATIENT)
Age: 35
End: 2025-05-05

## 2025-05-05 LAB
CPT BILLING CODE: NORMAL
DIAGNOSIS SYNOPSIS:: NORMAL
DX ICD CODE: NORMAL
PATH REPORT.FINAL DX SPEC: NORMAL
PATH REPORT.GROSS SPEC: NORMAL
PATH REPORT.SITE OF ORIGIN SPEC: NORMAL
PATHOLOGIST NAME: NORMAL
PAYMENT PROCEDURE: NORMAL

## (undated) DEVICE — INTENT OT USE PROVIDES A STERILE INTERFACE BETWEEN THE OPERATING ROOM SURGICAL LAMPS (NON-STERILE) AND THE SURGEON OR STAFF WORKING IN THE STERILE FIELD.: Brand: ASPEN® ALC PLUS LIGHT HANDLE COVER

## (undated) DEVICE — SOLUTION IV 1000ML 0.9% SOD CHL

## (undated) DEVICE — SYRINGE IRRIG 60ML SFT PLIABLE BLB EZ TO GRP 1 HND USE W/

## (undated) DEVICE — DRESSING BORDERED ADH GZ UNIV GEN USE 8INX4IN AND 6INX2IN

## (undated) DEVICE — CLEANER ES TIP W2XL2IN ADH BK RADPQ FOR S STL ELECTRD

## (undated) DEVICE — 3000CC GUARDIAN II: Brand: GUARDIAN

## (undated) DEVICE — ELECTRODE PT RET AD L9FT HI MOIST COND ADH HYDRGEL CORDED

## (undated) DEVICE — TRAY,URINE METER,100% SILICONE,16FR10ML: Brand: MEDLINE

## (undated) DEVICE — STERILE LATEX POWDER-FREE SURGICAL GLOVESWITH NITRILE COATING: Brand: PROTEXIS

## (undated) DEVICE — SOLIDIFIER FLUID 3000 CC ABSORB

## (undated) DEVICE — GARMENT,MEDLINE,DVT,INT,CALF,MED, GEN2: Brand: MEDLINE

## (undated) DEVICE — C-SECTION II-LF: Brand: MEDLINE INDUSTRIES, INC.

## (undated) DEVICE — STAPLER SKIN H3.9MM WIRE DIA0.58MM CRWN 6.9MM 35 STPL ROT

## (undated) DEVICE — SUTURE MONOCRYL SZ 3-0 L27IN ABSRB UD L60MM KS STR REV CUT Y523H

## (undated) DEVICE — Z DISCONTINUED USE 2220179 SUTURE VCRL SZ 0 L36IN ABSRB VLT L40MM CT 1/2 CIR J358H

## (undated) DEVICE — Z INACTIVE NO ACTIVE SUPPLIER APPLICATOR MEDICATED 26 CC TINT HI-LITE ORNG STRL CHLORAPREP

## (undated) DEVICE — BLADE CLIPPER GEN PURP NS

## (undated) DEVICE — PENCIL ES L3M ROCK SWCH S STL HEX LOK BLDE ELECTRD HOLSTER

## (undated) DEVICE — SPONGE: LAP 18X18 W  200/CS: Brand: MEDICAL ACTION INDUSTRIES

## (undated) DEVICE — POOLE SUCTION INSTRUMENT WITH REMOVABLE SHEATH: Brand: POOLE

## (undated) DEVICE — SUTURE VICRYL ABSRB BRAID COAT UD CT NO 1 36IN  J959H

## (undated) DEVICE — TOWEL,OR,DSP,ST,BLUE,STD,2/PK,40PK/CS: Brand: MEDLINE